# Patient Record
Sex: FEMALE | Race: WHITE | NOT HISPANIC OR LATINO | Employment: STUDENT | URBAN - NONMETROPOLITAN AREA
[De-identification: names, ages, dates, MRNs, and addresses within clinical notes are randomized per-mention and may not be internally consistent; named-entity substitution may affect disease eponyms.]

---

## 2020-06-19 ENCOUNTER — TELEMEDICINE (OUTPATIENT)
Dept: FAMILY MEDICINE CLINIC | Facility: CLINIC | Age: 20
End: 2020-06-19
Payer: COMMERCIAL

## 2020-06-19 DIAGNOSIS — Z00.00 ENCOUNTER FOR MEDICAL EXAMINATION TO ESTABLISH CARE: Primary | ICD-10-CM

## 2020-06-19 DIAGNOSIS — F90.2 ATTENTION DEFICIT HYPERACTIVITY DISORDER (ADHD), COMBINED TYPE: ICD-10-CM

## 2020-06-19 DIAGNOSIS — F41.8 ANXIETY WITH DEPRESSION: ICD-10-CM

## 2020-06-19 PROBLEM — F90.9 ADHD: Status: ACTIVE | Noted: 2020-06-19

## 2020-06-19 PROCEDURE — 99202 OFFICE O/P NEW SF 15 MIN: CPT | Performed by: NURSE PRACTITIONER

## 2020-06-19 RX ORDER — DEXTROAMPHETAMINE SACCHARATE, AMPHETAMINE ASPARTATE, DEXTROAMPHETAMINE SULFATE AND AMPHETAMINE SULFATE 5; 5; 5; 5 MG/1; MG/1; MG/1; MG/1
TABLET ORAL
COMMUNITY
Start: 2016-09-30 | End: 2020-10-02

## 2020-10-02 ENCOUNTER — APPOINTMENT (EMERGENCY)
Dept: RADIOLOGY | Facility: HOSPITAL | Age: 20
End: 2020-10-02
Payer: COMMERCIAL

## 2020-10-02 ENCOUNTER — TELEPHONE (OUTPATIENT)
Dept: OBGYN CLINIC | Facility: HOSPITAL | Age: 20
End: 2020-10-02

## 2020-10-02 ENCOUNTER — HOSPITAL ENCOUNTER (OUTPATIENT)
Facility: HOSPITAL | Age: 20
Setting detail: OBSERVATION
Discharge: HOME | End: 2020-10-05
Attending: EMERGENCY MEDICINE | Admitting: FAMILY MEDICINE
Payer: COMMERCIAL

## 2020-10-02 DIAGNOSIS — S82.892A CLOSED FRACTURE OF LEFT ANKLE, INITIAL ENCOUNTER: ICD-10-CM

## 2020-10-02 DIAGNOSIS — R11.0 NAUSEA: ICD-10-CM

## 2020-10-02 DIAGNOSIS — S82.409A FIBULA FRACTURE: Primary | ICD-10-CM

## 2020-10-02 DIAGNOSIS — M79.605 PAIN OF LEFT LOWER EXTREMITY: ICD-10-CM

## 2020-10-02 PROBLEM — S82.402A LEFT FIBULAR FRACTURE: Status: ACTIVE | Noted: 2020-10-02

## 2020-10-02 PROCEDURE — 73610 X-RAY EXAM OF ANKLE: CPT

## 2020-10-02 PROCEDURE — 73630 X-RAY EXAM OF FOOT: CPT

## 2020-10-02 PROCEDURE — 99284 EMERGENCY DEPT VISIT MOD MDM: CPT

## 2020-10-02 PROCEDURE — 96375 TX/PRO/DX INJ NEW DRUG ADDON: CPT

## 2020-10-02 PROCEDURE — 96374 THER/PROPH/DIAG INJ IV PUSH: CPT

## 2020-10-02 PROCEDURE — NC001 PR NO CHARGE: Performed by: FAMILY MEDICINE

## 2020-10-02 PROCEDURE — 99285 EMERGENCY DEPT VISIT HI MDM: CPT | Performed by: EMERGENCY MEDICINE

## 2020-10-02 PROCEDURE — 73590 X-RAY EXAM OF LOWER LEG: CPT

## 2020-10-02 RX ORDER — KETOROLAC TROMETHAMINE 30 MG/ML
30 INJECTION, SOLUTION INTRAMUSCULAR; INTRAVENOUS ONCE
Status: COMPLETED | OUTPATIENT
Start: 2020-10-02 | End: 2020-10-02

## 2020-10-02 RX ORDER — OXYCODONE HYDROCHLORIDE AND ACETAMINOPHEN 5; 325 MG/1; MG/1
1 TABLET ORAL EVERY 4 HOURS PRN
Status: ON HOLD | COMMUNITY
End: 2020-10-05 | Stop reason: SDUPTHER

## 2020-10-02 RX ORDER — METOCLOPRAMIDE HYDROCHLORIDE 5 MG/ML
10 INJECTION INTRAMUSCULAR; INTRAVENOUS ONCE
Status: COMPLETED | OUTPATIENT
Start: 2020-10-02 | End: 2020-10-02

## 2020-10-02 RX ORDER — ONDANSETRON 2 MG/ML
4 INJECTION INTRAMUSCULAR; INTRAVENOUS EVERY 6 HOURS PRN
Status: DISCONTINUED | OUTPATIENT
Start: 2020-10-02 | End: 2020-10-02

## 2020-10-02 RX ORDER — ONDANSETRON 2 MG/ML
4 INJECTION INTRAMUSCULAR; INTRAVENOUS ONCE
Status: COMPLETED | OUTPATIENT
Start: 2020-10-02 | End: 2020-10-02

## 2020-10-02 RX ORDER — ONDANSETRON 2 MG/ML
4 INJECTION INTRAMUSCULAR; INTRAVENOUS EVERY 6 HOURS SCHEDULED
Status: DISCONTINUED | OUTPATIENT
Start: 2020-10-03 | End: 2020-10-03

## 2020-10-02 RX ORDER — OXYCODONE HYDROCHLORIDE AND ACETAMINOPHEN 5; 325 MG/1; MG/1
1 TABLET ORAL EVERY 4 HOURS PRN
Status: DISCONTINUED | OUTPATIENT
Start: 2020-10-02 | End: 2020-10-04

## 2020-10-02 RX ADMIN — METOCLOPRAMIDE 10 MG: 5 INJECTION, SOLUTION INTRAMUSCULAR; INTRAVENOUS at 22:39

## 2020-10-02 RX ADMIN — MORPHINE SULFATE 2 MG: 2 INJECTION, SOLUTION INTRAMUSCULAR; INTRAVENOUS at 18:29

## 2020-10-02 RX ADMIN — KETOROLAC TROMETHAMINE 30 MG: 30 INJECTION, SOLUTION INTRAMUSCULAR at 22:30

## 2020-10-02 RX ADMIN — ONDANSETRON 4 MG: 2 INJECTION INTRAMUSCULAR; INTRAVENOUS at 18:28

## 2020-10-03 ENCOUNTER — ANESTHESIA EVENT (OUTPATIENT)
Dept: PERIOP | Facility: HOSPITAL | Age: 20
End: 2020-10-03
Payer: COMMERCIAL

## 2020-10-03 PROBLEM — S82.892A CLOSED LEFT ANKLE FRACTURE: Status: ACTIVE | Noted: 2020-10-02

## 2020-10-03 LAB
ABO GROUP BLD: NORMAL
ABO GROUP BLD: NORMAL
ANION GAP SERPL CALCULATED.3IONS-SCNC: 6 MMOL/L (ref 4–13)
APTT PPP: 32 SECONDS (ref 23–37)
BLD GP AB SCN SERPL QL: NEGATIVE
BUN SERPL-MCNC: 10 MG/DL (ref 5–25)
CALCIUM SERPL-MCNC: 9.5 MG/DL (ref 8.3–10.1)
CHLORIDE SERPL-SCNC: 108 MMOL/L (ref 100–108)
CO2 SERPL-SCNC: 25 MMOL/L (ref 21–32)
CREAT SERPL-MCNC: 0.64 MG/DL (ref 0.6–1.3)
ERYTHROCYTE [DISTWIDTH] IN BLOOD BY AUTOMATED COUNT: 13.2 % (ref 11.6–15.1)
GFR SERPL CREATININE-BSD FRML MDRD: 128 ML/MIN/1.73SQ M
GLUCOSE SERPL-MCNC: 98 MG/DL (ref 65–140)
HCG SERPL QL: NEGATIVE
HCT VFR BLD AUTO: 41.1 % (ref 34.8–46.1)
HGB BLD-MCNC: 13.6 G/DL (ref 11.5–15.4)
INR PPP: 1.08 (ref 0.84–1.19)
MCH RBC QN AUTO: 28.2 PG (ref 26.8–34.3)
MCHC RBC AUTO-ENTMCNC: 33.1 G/DL (ref 31.4–37.4)
MCV RBC AUTO: 85 FL (ref 82–98)
PLATELET # BLD AUTO: 330 THOUSANDS/UL (ref 149–390)
PLATELET # BLD AUTO: 339 THOUSANDS/UL (ref 149–390)
PMV BLD AUTO: 9.8 FL (ref 8.9–12.7)
PMV BLD AUTO: 9.9 FL (ref 8.9–12.7)
POTASSIUM SERPL-SCNC: 3.5 MMOL/L (ref 3.5–5.3)
PROTHROMBIN TIME: 14 SECONDS (ref 11.6–14.5)
RBC # BLD AUTO: 4.82 MILLION/UL (ref 3.81–5.12)
RH BLD: POSITIVE
RH BLD: POSITIVE
SARS-COV-2 RNA RESP QL NAA+PROBE: NEGATIVE
SODIUM SERPL-SCNC: 139 MMOL/L (ref 136–145)
SPECIMEN EXPIRATION DATE: NORMAL
WBC # BLD AUTO: 11.96 THOUSAND/UL (ref 4.31–10.16)

## 2020-10-03 PROCEDURE — 99245 OFF/OP CONSLTJ NEW/EST HI 55: CPT | Performed by: ORTHOPAEDIC SURGERY

## 2020-10-03 PROCEDURE — 86901 BLOOD TYPING SEROLOGIC RH(D): CPT | Performed by: ORTHOPAEDIC SURGERY

## 2020-10-03 PROCEDURE — U0003 INFECTIOUS AGENT DETECTION BY NUCLEIC ACID (DNA OR RNA); SEVERE ACUTE RESPIRATORY SYNDROME CORONAVIRUS 2 (SARS-COV-2) (CORONAVIRUS DISEASE [COVID-19]), AMPLIFIED PROBE TECHNIQUE, MAKING USE OF HIGH THROUGHPUT TECHNOLOGIES AS DESCRIBED BY CMS-2020-01-R: HCPCS | Performed by: ORTHOPAEDIC SURGERY

## 2020-10-03 PROCEDURE — 80048 BASIC METABOLIC PNL TOTAL CA: CPT | Performed by: ORTHOPAEDIC SURGERY

## 2020-10-03 PROCEDURE — 86850 RBC ANTIBODY SCREEN: CPT | Performed by: ORTHOPAEDIC SURGERY

## 2020-10-03 PROCEDURE — 84703 CHORIONIC GONADOTROPIN ASSAY: CPT | Performed by: ORTHOPAEDIC SURGERY

## 2020-10-03 PROCEDURE — 90686 IIV4 VACC NO PRSV 0.5 ML IM: CPT | Performed by: FAMILY MEDICINE

## 2020-10-03 PROCEDURE — 85027 COMPLETE CBC AUTOMATED: CPT | Performed by: ORTHOPAEDIC SURGERY

## 2020-10-03 PROCEDURE — 90471 IMMUNIZATION ADMIN: CPT | Performed by: FAMILY MEDICINE

## 2020-10-03 PROCEDURE — 85730 THROMBOPLASTIN TIME PARTIAL: CPT | Performed by: ORTHOPAEDIC SURGERY

## 2020-10-03 PROCEDURE — 99219 PR INITIAL OBSERVATION CARE/DAY 50 MINUTES: CPT | Performed by: FAMILY MEDICINE

## 2020-10-03 PROCEDURE — 85049 AUTOMATED PLATELET COUNT: CPT | Performed by: FAMILY MEDICINE

## 2020-10-03 PROCEDURE — 86900 BLOOD TYPING SEROLOGIC ABO: CPT | Performed by: ORTHOPAEDIC SURGERY

## 2020-10-03 PROCEDURE — NC001 PR NO CHARGE: Performed by: ORTHOPAEDIC SURGERY

## 2020-10-03 PROCEDURE — 85610 PROTHROMBIN TIME: CPT | Performed by: ORTHOPAEDIC SURGERY

## 2020-10-03 RX ORDER — ONDANSETRON 2 MG/ML
4 INJECTION INTRAMUSCULAR; INTRAVENOUS EVERY 6 HOURS PRN
Status: DISCONTINUED | OUTPATIENT
Start: 2020-10-03 | End: 2020-10-05 | Stop reason: HOSPADM

## 2020-10-03 RX ORDER — KETOROLAC TROMETHAMINE 30 MG/ML
30 INJECTION, SOLUTION INTRAMUSCULAR; INTRAVENOUS ONCE
Status: COMPLETED | OUTPATIENT
Start: 2020-10-03 | End: 2020-10-03

## 2020-10-03 RX ORDER — ACETAMINOPHEN 325 MG/1
650 TABLET ORAL EVERY 6 HOURS PRN
Status: DISCONTINUED | OUTPATIENT
Start: 2020-10-03 | End: 2020-10-04

## 2020-10-03 RX ORDER — SODIUM CHLORIDE, SODIUM LACTATE, POTASSIUM CHLORIDE, CALCIUM CHLORIDE 600; 310; 30; 20 MG/100ML; MG/100ML; MG/100ML; MG/100ML
75 INJECTION, SOLUTION INTRAVENOUS CONTINUOUS
Status: DISCONTINUED | OUTPATIENT
Start: 2020-10-04 | End: 2020-10-05 | Stop reason: HOSPADM

## 2020-10-03 RX ADMIN — OXYCODONE HYDROCHLORIDE AND ACETAMINOPHEN 1 TABLET: 5; 325 TABLET ORAL at 19:19

## 2020-10-03 RX ADMIN — ONDANSETRON 4 MG: 2 INJECTION INTRAMUSCULAR; INTRAVENOUS at 05:40

## 2020-10-03 RX ADMIN — INFLUENZA VIRUS VACCINE 0.5 ML: 15; 15; 15; 15 SUSPENSION INTRAMUSCULAR at 05:38

## 2020-10-03 RX ADMIN — ONDANSETRON 4 MG: 2 INJECTION INTRAMUSCULAR; INTRAVENOUS at 00:41

## 2020-10-03 RX ADMIN — KETOROLAC TROMETHAMINE 30 MG: 30 INJECTION, SOLUTION INTRAMUSCULAR at 13:42

## 2020-10-03 RX ADMIN — ENOXAPARIN SODIUM 40 MG: 40 INJECTION SUBCUTANEOUS at 08:27

## 2020-10-04 ENCOUNTER — ANESTHESIA (OUTPATIENT)
Dept: PERIOP | Facility: HOSPITAL | Age: 20
End: 2020-10-04
Payer: COMMERCIAL

## 2020-10-04 ENCOUNTER — APPOINTMENT (OUTPATIENT)
Dept: RADIOLOGY | Facility: HOSPITAL | Age: 20
End: 2020-10-04
Payer: COMMERCIAL

## 2020-10-04 VITALS — HEART RATE: 108 BPM

## 2020-10-04 LAB
BASOPHILS # BLD AUTO: 0.05 THOUSANDS/ΜL (ref 0–0.1)
BASOPHILS NFR BLD AUTO: 1 % (ref 0–1)
EOSINOPHIL # BLD AUTO: 0.17 THOUSAND/ΜL (ref 0–0.61)
EOSINOPHIL NFR BLD AUTO: 2 % (ref 0–6)
ERYTHROCYTE [DISTWIDTH] IN BLOOD BY AUTOMATED COUNT: 13.1 % (ref 11.6–15.1)
HCT VFR BLD AUTO: 40.8 % (ref 34.8–46.1)
HGB BLD-MCNC: 13 G/DL (ref 11.5–15.4)
IMM GRANULOCYTES # BLD AUTO: 0.06 THOUSAND/UL (ref 0–0.2)
IMM GRANULOCYTES NFR BLD AUTO: 1 % (ref 0–2)
LYMPHOCYTES # BLD AUTO: 3.31 THOUSANDS/ΜL (ref 0.6–4.47)
LYMPHOCYTES NFR BLD AUTO: 31 % (ref 14–44)
MCH RBC QN AUTO: 27.8 PG (ref 26.8–34.3)
MCHC RBC AUTO-ENTMCNC: 31.9 G/DL (ref 31.4–37.4)
MCV RBC AUTO: 87 FL (ref 82–98)
MONOCYTES # BLD AUTO: 0.91 THOUSAND/ΜL (ref 0.17–1.22)
MONOCYTES NFR BLD AUTO: 9 % (ref 4–12)
NEUTROPHILS # BLD AUTO: 6.22 THOUSANDS/ΜL (ref 1.85–7.62)
NEUTS SEG NFR BLD AUTO: 56 % (ref 43–75)
NRBC BLD AUTO-RTO: 0 /100 WBCS
PLATELET # BLD AUTO: 325 THOUSANDS/UL (ref 149–390)
PMV BLD AUTO: 10 FL (ref 8.9–12.7)
RBC # BLD AUTO: 4.68 MILLION/UL (ref 3.81–5.12)
WBC # BLD AUTO: 10.72 THOUSAND/UL (ref 4.31–10.16)

## 2020-10-04 PROCEDURE — 27784 TREATMENT OF FIBULA FRACTURE: CPT | Performed by: ORTHOPAEDIC SURGERY

## 2020-10-04 PROCEDURE — C1713 ANCHOR/SCREW BN/BN,TIS/BN: HCPCS | Performed by: ORTHOPAEDIC SURGERY

## 2020-10-04 PROCEDURE — 99225 PR SBSQ OBSERVATION CARE/DAY 25 MINUTES: CPT | Performed by: FAMILY MEDICINE

## 2020-10-04 PROCEDURE — 73610 X-RAY EXAM OF ANKLE: CPT

## 2020-10-04 PROCEDURE — NC001 PR NO CHARGE: Performed by: ORTHOPAEDIC SURGERY

## 2020-10-04 PROCEDURE — 85025 COMPLETE CBC W/AUTO DIFF WBC: CPT | Performed by: ORTHOPAEDIC SURGERY

## 2020-10-04 PROCEDURE — 27829 TREAT LOWER LEG JOINT: CPT | Performed by: ORTHOPAEDIC SURGERY

## 2020-10-04 DEVICE — 3.5MM LCP PLATE 10 HOLES 137MM
Type: IMPLANTABLE DEVICE | Site: ANKLE | Status: FUNCTIONAL
Brand: LCP

## 2020-10-04 DEVICE — 3.5MM CORTEX SCREW SELF-TAPPING 14MM: Type: IMPLANTABLE DEVICE | Site: ANKLE | Status: FUNCTIONAL

## 2020-10-04 DEVICE — 3.5MM CORTEX SCREW SELF-TAPPING 12MM: Type: IMPLANTABLE DEVICE | Site: ANKLE | Status: FUNCTIONAL

## 2020-10-04 DEVICE — 4.0MM CANCELLOUS BONE SCREW FULLY THREADED/16MM: Type: IMPLANTABLE DEVICE | Site: ANKLE | Status: FUNCTIONAL

## 2020-10-04 DEVICE — 3.5MM CORTEX SCREW SELF-TAPPING 50MM: Type: IMPLANTABLE DEVICE | Site: ANKLE | Status: FUNCTIONAL

## 2020-10-04 RX ORDER — CEFAZOLIN SODIUM 2 G/50ML
SOLUTION INTRAVENOUS AS NEEDED
Status: DISCONTINUED | OUTPATIENT
Start: 2020-10-04 | End: 2020-10-04

## 2020-10-04 RX ORDER — ONDANSETRON 2 MG/ML
INJECTION INTRAMUSCULAR; INTRAVENOUS AS NEEDED
Status: DISCONTINUED | OUTPATIENT
Start: 2020-10-04 | End: 2020-10-04

## 2020-10-04 RX ORDER — PROPOFOL 10 MG/ML
INJECTION, EMULSION INTRAVENOUS AS NEEDED
Status: DISCONTINUED | OUTPATIENT
Start: 2020-10-04 | End: 2020-10-04

## 2020-10-04 RX ORDER — FENTANYL CITRATE 50 UG/ML
INJECTION, SOLUTION INTRAMUSCULAR; INTRAVENOUS AS NEEDED
Status: DISCONTINUED | OUTPATIENT
Start: 2020-10-04 | End: 2020-10-04

## 2020-10-04 RX ORDER — MIDAZOLAM HYDROCHLORIDE 2 MG/2ML
INJECTION, SOLUTION INTRAMUSCULAR; INTRAVENOUS AS NEEDED
Status: DISCONTINUED | OUTPATIENT
Start: 2020-10-04 | End: 2020-10-04

## 2020-10-04 RX ORDER — OXYCODONE HYDROCHLORIDE 5 MG/1
5 TABLET ORAL EVERY 4 HOURS PRN
Status: DISCONTINUED | OUTPATIENT
Start: 2020-10-04 | End: 2020-10-05 | Stop reason: HOSPADM

## 2020-10-04 RX ORDER — ACETAMINOPHEN 325 MG/1
975 TABLET ORAL EVERY 8 HOURS SCHEDULED
Status: DISCONTINUED | OUTPATIENT
Start: 2020-10-04 | End: 2020-10-05 | Stop reason: HOSPADM

## 2020-10-04 RX ORDER — HYDROMORPHONE HCL/PF 1 MG/ML
0.4 SYRINGE (ML) INJECTION
Status: COMPLETED | OUTPATIENT
Start: 2020-10-04 | End: 2020-10-04

## 2020-10-04 RX ORDER — GABAPENTIN 100 MG/1
100 CAPSULE ORAL EVERY 8 HOURS
Status: DISCONTINUED | OUTPATIENT
Start: 2020-10-04 | End: 2020-10-05 | Stop reason: HOSPADM

## 2020-10-04 RX ORDER — ROPIVACAINE HYDROCHLORIDE 5 MG/ML
INJECTION, SOLUTION EPIDURAL; INFILTRATION; PERINEURAL
Status: COMPLETED | OUTPATIENT
Start: 2020-10-04 | End: 2020-10-04

## 2020-10-04 RX ORDER — OXYCODONE HYDROCHLORIDE 5 MG/1
10 TABLET ORAL EVERY 4 HOURS PRN
Status: DISCONTINUED | OUTPATIENT
Start: 2020-10-04 | End: 2020-10-05 | Stop reason: HOSPADM

## 2020-10-04 RX ORDER — MAGNESIUM HYDROXIDE 1200 MG/15ML
LIQUID ORAL AS NEEDED
Status: DISCONTINUED | OUTPATIENT
Start: 2020-10-04 | End: 2020-10-04 | Stop reason: HOSPADM

## 2020-10-04 RX ORDER — SODIUM CHLORIDE, SODIUM LACTATE, POTASSIUM CHLORIDE, CALCIUM CHLORIDE 600; 310; 30; 20 MG/100ML; MG/100ML; MG/100ML; MG/100ML
125 INJECTION, SOLUTION INTRAVENOUS CONTINUOUS
Status: DISCONTINUED | OUTPATIENT
Start: 2020-10-04 | End: 2020-10-05 | Stop reason: HOSPADM

## 2020-10-04 RX ORDER — FENTANYL CITRATE/PF 50 MCG/ML
25 SYRINGE (ML) INJECTION
Status: DISCONTINUED | OUTPATIENT
Start: 2020-10-04 | End: 2020-10-04

## 2020-10-04 RX ORDER — LIDOCAINE HYDROCHLORIDE 10 MG/ML
INJECTION, SOLUTION EPIDURAL; INFILTRATION; INTRACAUDAL; PERINEURAL AS NEEDED
Status: DISCONTINUED | OUTPATIENT
Start: 2020-10-04 | End: 2020-10-04

## 2020-10-04 RX ORDER — ONDANSETRON 2 MG/ML
4 INJECTION INTRAMUSCULAR; INTRAVENOUS ONCE AS NEEDED
Status: DISCONTINUED | OUTPATIENT
Start: 2020-10-04 | End: 2020-10-04

## 2020-10-04 RX ORDER — HYDROMORPHONE HCL/PF 1 MG/ML
SYRINGE (ML) INJECTION AS NEEDED
Status: DISCONTINUED | OUTPATIENT
Start: 2020-10-04 | End: 2020-10-04

## 2020-10-04 RX ORDER — METHOCARBAMOL 750 MG/1
750 TABLET, FILM COATED ORAL EVERY 6 HOURS SCHEDULED
Status: DISCONTINUED | OUTPATIENT
Start: 2020-10-04 | End: 2020-10-05 | Stop reason: HOSPADM

## 2020-10-04 RX ORDER — DEXAMETHASONE SODIUM PHOSPHATE 10 MG/ML
INJECTION, SOLUTION INTRAMUSCULAR; INTRAVENOUS AS NEEDED
Status: DISCONTINUED | OUTPATIENT
Start: 2020-10-04 | End: 2020-10-04

## 2020-10-04 RX ADMIN — HYDROMORPHONE HYDROCHLORIDE 0.5 MG: 1 INJECTION, SOLUTION INTRAMUSCULAR; INTRAVENOUS; SUBCUTANEOUS at 08:40

## 2020-10-04 RX ADMIN — ONDANSETRON 4 MG: 2 INJECTION INTRAMUSCULAR; INTRAVENOUS at 08:12

## 2020-10-04 RX ADMIN — MIDAZOLAM 2 MG: 1 INJECTION INTRAMUSCULAR; INTRAVENOUS at 08:07

## 2020-10-04 RX ADMIN — CEFAZOLIN SODIUM 2000 MG: 10 INJECTION, POWDER, FOR SOLUTION INTRAVENOUS at 15:07

## 2020-10-04 RX ADMIN — HYDROMORPHONE HYDROCHLORIDE 0.4 MG: 1 INJECTION, SOLUTION INTRAMUSCULAR; INTRAVENOUS; SUBCUTANEOUS at 09:48

## 2020-10-04 RX ADMIN — PROPOFOL 200 MG: 10 INJECTION, EMULSION INTRAVENOUS at 08:10

## 2020-10-04 RX ADMIN — CEFAZOLIN SODIUM 2000 MG: 2 SOLUTION INTRAVENOUS at 08:05

## 2020-10-04 RX ADMIN — FENTANYL CITRATE 50 MCG: 50 INJECTION, SOLUTION INTRAMUSCULAR; INTRAVENOUS at 08:58

## 2020-10-04 RX ADMIN — HYDROMORPHONE HYDROCHLORIDE 0.4 MG: 1 INJECTION, SOLUTION INTRAMUSCULAR; INTRAVENOUS; SUBCUTANEOUS at 09:26

## 2020-10-04 RX ADMIN — SODIUM CHLORIDE, SODIUM LACTATE, POTASSIUM CHLORIDE, AND CALCIUM CHLORIDE 75 ML/HR: .6; .31; .03; .02 INJECTION, SOLUTION INTRAVENOUS at 00:20

## 2020-10-04 RX ADMIN — FENTANYL CITRATE 50 MCG: 50 INJECTION, SOLUTION INTRAMUSCULAR; INTRAVENOUS at 08:33

## 2020-10-04 RX ADMIN — ONDANSETRON 4 MG: 2 INJECTION INTRAMUSCULAR; INTRAVENOUS at 07:24

## 2020-10-04 RX ADMIN — CEFAZOLIN SODIUM 2000 MG: 10 INJECTION, POWDER, FOR SOLUTION INTRAVENOUS at 23:47

## 2020-10-04 RX ADMIN — HYDROMORPHONE HYDROCHLORIDE 0.4 MG: 1 INJECTION, SOLUTION INTRAMUSCULAR; INTRAVENOUS; SUBCUTANEOUS at 09:54

## 2020-10-04 RX ADMIN — ENOXAPARIN SODIUM 40 MG: 40 INJECTION SUBCUTANEOUS at 23:47

## 2020-10-04 RX ADMIN — FENTANYL CITRATE 50 MCG: 50 INJECTION, SOLUTION INTRAMUSCULAR; INTRAVENOUS at 08:13

## 2020-10-04 RX ADMIN — DOCUSATE SODIUM 50 MG: 50 CAPSULE, LIQUID FILLED ORAL at 18:04

## 2020-10-04 RX ADMIN — ACETAMINOPHEN 975 MG: 325 TABLET, FILM COATED ORAL at 15:06

## 2020-10-04 RX ADMIN — METHOCARBAMOL TABLETS 750 MG: 750 TABLET, COATED ORAL at 23:47

## 2020-10-04 RX ADMIN — LIDOCAINE HYDROCHLORIDE 50 MG: 10 INJECTION, SOLUTION EPIDURAL; INFILTRATION; INTRACAUDAL; PERINEURAL at 08:10

## 2020-10-04 RX ADMIN — METHOCARBAMOL TABLETS 750 MG: 750 TABLET, COATED ORAL at 11:21

## 2020-10-04 RX ADMIN — ROPIVACAINE HYDROCHLORIDE 30 ML: 5 INJECTION, SOLUTION EPIDURAL; INFILTRATION; PERINEURAL at 10:20

## 2020-10-04 RX ADMIN — METHOCARBAMOL TABLETS 750 MG: 750 TABLET, COATED ORAL at 18:04

## 2020-10-04 RX ADMIN — HYDROMORPHONE HYDROCHLORIDE 0.4 MG: 1 INJECTION, SOLUTION INTRAMUSCULAR; INTRAVENOUS; SUBCUTANEOUS at 09:34

## 2020-10-04 RX ADMIN — ACETAMINOPHEN 975 MG: 325 TABLET, FILM COATED ORAL at 22:04

## 2020-10-04 RX ADMIN — GABAPENTIN 100 MG: 100 CAPSULE ORAL at 11:21

## 2020-10-04 RX ADMIN — FENTANYL CITRATE 50 MCG: 50 INJECTION, SOLUTION INTRAMUSCULAR; INTRAVENOUS at 09:11

## 2020-10-04 RX ADMIN — HYDROMORPHONE HYDROCHLORIDE 0.4 MG: 1 INJECTION, SOLUTION INTRAMUSCULAR; INTRAVENOUS; SUBCUTANEOUS at 09:42

## 2020-10-04 RX ADMIN — GABAPENTIN 100 MG: 100 CAPSULE ORAL at 18:04

## 2020-10-04 RX ADMIN — FENTANYL CITRATE 50 MCG: 50 INJECTION, SOLUTION INTRAMUSCULAR; INTRAVENOUS at 08:27

## 2020-10-04 RX ADMIN — FENTANYL CITRATE 50 MCG: 50 INJECTION, SOLUTION INTRAMUSCULAR; INTRAVENOUS at 08:45

## 2020-10-04 RX ADMIN — DEXAMETHASONE SODIUM PHOSPHATE 5 MG: 10 INJECTION, SOLUTION INTRAMUSCULAR; INTRAVENOUS at 08:12

## 2020-10-05 ENCOUNTER — TRANSITIONAL CARE MANAGEMENT (OUTPATIENT)
Dept: FAMILY MEDICINE CLINIC | Facility: CLINIC | Age: 20
End: 2020-10-05

## 2020-10-05 VITALS
HEIGHT: 67 IN | BODY MASS INDEX: 36.88 KG/M2 | OXYGEN SATURATION: 96 % | WEIGHT: 235 LBS | SYSTOLIC BLOOD PRESSURE: 120 MMHG | RESPIRATION RATE: 18 BRPM | TEMPERATURE: 99.6 F | HEART RATE: 66 BPM | DIASTOLIC BLOOD PRESSURE: 78 MMHG

## 2020-10-05 LAB
ANION GAP SERPL CALCULATED.3IONS-SCNC: 7 MMOL/L (ref 4–13)
BUN SERPL-MCNC: 9 MG/DL (ref 5–25)
CALCIUM SERPL-MCNC: 9.2 MG/DL (ref 8.3–10.1)
CHLORIDE SERPL-SCNC: 109 MMOL/L (ref 100–108)
CO2 SERPL-SCNC: 25 MMOL/L (ref 21–32)
CREAT SERPL-MCNC: 0.58 MG/DL (ref 0.6–1.3)
ERYTHROCYTE [DISTWIDTH] IN BLOOD BY AUTOMATED COUNT: 12.8 % (ref 11.6–15.1)
GFR SERPL CREATININE-BSD FRML MDRD: 132 ML/MIN/1.73SQ M
GLUCOSE SERPL-MCNC: 89 MG/DL (ref 65–140)
HCT VFR BLD AUTO: 38 % (ref 34.8–46.1)
HGB BLD-MCNC: 12.3 G/DL (ref 11.5–15.4)
MCH RBC QN AUTO: 27.6 PG (ref 26.8–34.3)
MCHC RBC AUTO-ENTMCNC: 32.4 G/DL (ref 31.4–37.4)
MCV RBC AUTO: 85 FL (ref 82–98)
PLATELET # BLD AUTO: 344 THOUSANDS/UL (ref 149–390)
PMV BLD AUTO: 9.8 FL (ref 8.9–12.7)
POTASSIUM SERPL-SCNC: 3.6 MMOL/L (ref 3.5–5.3)
RBC # BLD AUTO: 4.46 MILLION/UL (ref 3.81–5.12)
SODIUM SERPL-SCNC: 141 MMOL/L (ref 136–145)
WBC # BLD AUTO: 12.42 THOUSAND/UL (ref 4.31–10.16)

## 2020-10-05 PROCEDURE — 85027 COMPLETE CBC AUTOMATED: CPT | Performed by: ORTHOPAEDIC SURGERY

## 2020-10-05 PROCEDURE — 97163 PT EVAL HIGH COMPLEX 45 MIN: CPT

## 2020-10-05 PROCEDURE — 80048 BASIC METABOLIC PNL TOTAL CA: CPT | Performed by: ORTHOPAEDIC SURGERY

## 2020-10-05 PROCEDURE — 99217 PR OBSERVATION CARE DISCHARGE MANAGEMENT: CPT | Performed by: FAMILY MEDICINE

## 2020-10-05 PROCEDURE — 99024 POSTOP FOLLOW-UP VISIT: CPT | Performed by: ORTHOPAEDIC SURGERY

## 2020-10-05 PROCEDURE — 97166 OT EVAL MOD COMPLEX 45 MIN: CPT

## 2020-10-05 RX ORDER — GABAPENTIN 100 MG/1
100 CAPSULE ORAL EVERY 8 HOURS
Qty: 90 CAPSULE | Refills: 0 | Status: SHIPPED | OUTPATIENT
Start: 2020-10-05 | End: 2020-11-18

## 2020-10-05 RX ORDER — OXYCODONE HYDROCHLORIDE AND ACETAMINOPHEN 5; 325 MG/1; MG/1
1 TABLET ORAL EVERY 4 HOURS PRN
Qty: 42 TABLET | Refills: 0 | Status: SHIPPED | OUTPATIENT
Start: 2020-10-05 | End: 2020-10-05 | Stop reason: SDUPTHER

## 2020-10-05 RX ORDER — OXYCODONE HYDROCHLORIDE AND ACETAMINOPHEN 5; 325 MG/1; MG/1
1 TABLET ORAL EVERY 4 HOURS PRN
Qty: 42 TABLET | Refills: 0 | Status: SHIPPED | OUTPATIENT
Start: 2020-10-05 | End: 2020-10-15

## 2020-10-05 RX ORDER — ASPIRIN 81 MG/1
81 TABLET ORAL
Qty: 60 TABLET | Refills: 0 | Status: SHIPPED | OUTPATIENT
Start: 2020-10-05 | End: 2020-11-18

## 2020-10-05 RX ADMIN — DOCUSATE SODIUM 50 MG: 50 CAPSULE, LIQUID FILLED ORAL at 08:43

## 2020-10-05 RX ADMIN — METHOCARBAMOL TABLETS 750 MG: 750 TABLET, COATED ORAL at 11:06

## 2020-10-05 RX ADMIN — OXYCODONE HYDROCHLORIDE 5 MG: 5 TABLET ORAL at 05:41

## 2020-10-05 RX ADMIN — GABAPENTIN 100 MG: 100 CAPSULE ORAL at 03:34

## 2020-10-05 RX ADMIN — OXYCODONE HYDROCHLORIDE 10 MG: 5 TABLET ORAL at 12:16

## 2020-10-05 RX ADMIN — GABAPENTIN 100 MG: 100 CAPSULE ORAL at 11:06

## 2020-10-05 RX ADMIN — ACETAMINOPHEN 975 MG: 325 TABLET, FILM COATED ORAL at 05:35

## 2020-10-05 RX ADMIN — METHOCARBAMOL TABLETS 750 MG: 750 TABLET, COATED ORAL at 05:35

## 2020-10-06 ENCOUNTER — TELEPHONE (OUTPATIENT)
Dept: OBGYN CLINIC | Facility: HOSPITAL | Age: 20
End: 2020-10-06

## 2020-10-13 ENCOUNTER — TELEMEDICINE (OUTPATIENT)
Dept: FAMILY MEDICINE CLINIC | Facility: CLINIC | Age: 20
End: 2020-10-13
Payer: COMMERCIAL

## 2020-10-13 DIAGNOSIS — F33.2 SEVERE EPISODE OF RECURRENT MAJOR DEPRESSIVE DISORDER, WITHOUT PSYCHOTIC FEATURES (HCC): ICD-10-CM

## 2020-10-13 DIAGNOSIS — F90.2 ATTENTION DEFICIT HYPERACTIVITY DISORDER (ADHD), COMBINED TYPE: Primary | ICD-10-CM

## 2020-10-13 PROCEDURE — 99214 OFFICE O/P EST MOD 30 MIN: CPT | Performed by: FAMILY MEDICINE

## 2020-10-14 ENCOUNTER — DOCUMENTATION (OUTPATIENT)
Dept: BEHAVIORAL/MENTAL HEALTH CLINIC | Facility: CLINIC | Age: 20
End: 2020-10-14

## 2020-10-14 DIAGNOSIS — Z48.89 AFTERCARE FOLLOWING SURGERY: Primary | ICD-10-CM

## 2020-10-20 ENCOUNTER — HOSPITAL ENCOUNTER (OUTPATIENT)
Dept: RADIOLOGY | Facility: HOSPITAL | Age: 20
Discharge: HOME | End: 2020-10-20
Attending: ORTHOPAEDIC SURGERY
Payer: COMMERCIAL

## 2020-10-20 ENCOUNTER — OFFICE VISIT (OUTPATIENT)
Dept: OBGYN CLINIC | Facility: HOSPITAL | Age: 20
End: 2020-10-20

## 2020-10-20 VITALS
DIASTOLIC BLOOD PRESSURE: 79 MMHG | SYSTOLIC BLOOD PRESSURE: 128 MMHG | HEART RATE: 87 BPM | HEIGHT: 67 IN | BODY MASS INDEX: 36.81 KG/M2

## 2020-10-20 DIAGNOSIS — S82.409A FIBULA FRACTURE: ICD-10-CM

## 2020-10-20 DIAGNOSIS — Z48.89 AFTERCARE FOLLOWING SURGERY: ICD-10-CM

## 2020-10-20 DIAGNOSIS — Z09 FRACTURE FOLLOW-UP: Primary | ICD-10-CM

## 2020-10-20 PROCEDURE — 73610 X-RAY EXAM OF ANKLE: CPT

## 2020-10-20 PROCEDURE — 99024 POSTOP FOLLOW-UP VISIT: CPT | Performed by: ORTHOPAEDIC SURGERY

## 2020-10-21 ENCOUNTER — EVALUATION (OUTPATIENT)
Dept: PHYSICAL THERAPY | Facility: CLINIC | Age: 20
End: 2020-10-21
Payer: COMMERCIAL

## 2020-10-21 ENCOUNTER — TELEPHONE (OUTPATIENT)
Dept: OBGYN CLINIC | Facility: HOSPITAL | Age: 20
End: 2020-10-21

## 2020-10-21 DIAGNOSIS — Z09 FRACTURE FOLLOW-UP: Primary | ICD-10-CM

## 2020-10-21 PROCEDURE — 97140 MANUAL THERAPY 1/> REGIONS: CPT | Performed by: PHYSICAL THERAPIST

## 2020-10-21 PROCEDURE — 97161 PT EVAL LOW COMPLEX 20 MIN: CPT | Performed by: PHYSICAL THERAPIST

## 2020-10-22 ENCOUNTER — OFFICE VISIT (OUTPATIENT)
Dept: PHYSICAL THERAPY | Facility: CLINIC | Age: 20
End: 2020-10-22
Payer: COMMERCIAL

## 2020-10-22 DIAGNOSIS — Z09 FRACTURE FOLLOW-UP: Primary | ICD-10-CM

## 2020-10-22 PROCEDURE — 97110 THERAPEUTIC EXERCISES: CPT | Performed by: PHYSICAL THERAPIST

## 2020-10-22 PROCEDURE — 97140 MANUAL THERAPY 1/> REGIONS: CPT | Performed by: PHYSICAL THERAPIST

## 2020-10-23 ENCOUNTER — OFFICE VISIT (OUTPATIENT)
Dept: PHYSICAL THERAPY | Facility: CLINIC | Age: 20
End: 2020-10-23
Payer: COMMERCIAL

## 2020-10-23 DIAGNOSIS — Z09 FRACTURE FOLLOW-UP: Primary | ICD-10-CM

## 2020-10-23 PROCEDURE — 97110 THERAPEUTIC EXERCISES: CPT | Performed by: PHYSICAL THERAPIST

## 2020-10-23 PROCEDURE — 97140 MANUAL THERAPY 1/> REGIONS: CPT | Performed by: PHYSICAL THERAPIST

## 2020-10-26 ENCOUNTER — OFFICE VISIT (OUTPATIENT)
Dept: PHYSICAL THERAPY | Facility: CLINIC | Age: 20
End: 2020-10-26
Payer: COMMERCIAL

## 2020-10-26 ENCOUNTER — DOCUMENTATION (OUTPATIENT)
Dept: BEHAVIORAL/MENTAL HEALTH CLINIC | Facility: CLINIC | Age: 20
End: 2020-10-26

## 2020-10-26 DIAGNOSIS — Z09 FRACTURE FOLLOW-UP: Primary | ICD-10-CM

## 2020-10-26 PROCEDURE — 97010 HOT OR COLD PACKS THERAPY: CPT

## 2020-10-26 PROCEDURE — 97110 THERAPEUTIC EXERCISES: CPT

## 2020-10-26 PROCEDURE — 97140 MANUAL THERAPY 1/> REGIONS: CPT

## 2020-10-28 ENCOUNTER — APPOINTMENT (OUTPATIENT)
Dept: PHYSICAL THERAPY | Facility: CLINIC | Age: 20
End: 2020-10-28
Payer: COMMERCIAL

## 2020-10-29 ENCOUNTER — APPOINTMENT (OUTPATIENT)
Dept: PHYSICAL THERAPY | Facility: CLINIC | Age: 20
End: 2020-10-29
Payer: COMMERCIAL

## 2020-10-30 ENCOUNTER — TELEMEDICINE (OUTPATIENT)
Dept: BEHAVIORAL/MENTAL HEALTH CLINIC | Facility: CLINIC | Age: 20
End: 2020-10-30
Payer: COMMERCIAL

## 2020-10-30 ENCOUNTER — DOCUMENTATION (OUTPATIENT)
Dept: BEHAVIORAL/MENTAL HEALTH CLINIC | Facility: CLINIC | Age: 20
End: 2020-10-30

## 2020-10-30 DIAGNOSIS — F31.32 MODERATE BIPOLAR I DISORDER, MOST RECENT EPISODE DEPRESSED (HCC): Primary | ICD-10-CM

## 2020-10-30 PROCEDURE — 90837 PSYTX W PT 60 MINUTES: CPT | Performed by: SOCIAL WORKER

## 2020-10-30 PROCEDURE — 90791 PSYCH DIAGNOSTIC EVALUATION: CPT | Performed by: SOCIAL WORKER

## 2020-11-05 ENCOUNTER — TELEMEDICINE (OUTPATIENT)
Dept: BEHAVIORAL/MENTAL HEALTH CLINIC | Facility: CLINIC | Age: 20
End: 2020-11-05
Payer: COMMERCIAL

## 2020-11-05 DIAGNOSIS — F31.32 MODERATE BIPOLAR I DISORDER, MOST RECENT EPISODE DEPRESSED (HCC): Primary | ICD-10-CM

## 2020-11-05 PROCEDURE — 90837 PSYTX W PT 60 MINUTES: CPT | Performed by: SOCIAL WORKER

## 2020-11-09 DIAGNOSIS — F33.2 SEVERE EPISODE OF RECURRENT MAJOR DEPRESSIVE DISORDER, WITHOUT PSYCHOTIC FEATURES (HCC): Primary | ICD-10-CM

## 2020-11-10 ENCOUNTER — DOCUMENTATION (OUTPATIENT)
Dept: BEHAVIORAL/MENTAL HEALTH CLINIC | Facility: CLINIC | Age: 20
End: 2020-11-10

## 2020-11-10 ENCOUNTER — TELEPHONE (OUTPATIENT)
Dept: BEHAVIORAL/MENTAL HEALTH CLINIC | Facility: CLINIC | Age: 20
End: 2020-11-10

## 2020-11-11 ENCOUNTER — TELEPHONE (OUTPATIENT)
Dept: BEHAVIORAL/MENTAL HEALTH CLINIC | Facility: CLINIC | Age: 20
End: 2020-11-11

## 2020-11-16 ENCOUNTER — DOCUMENTATION (OUTPATIENT)
Dept: BEHAVIORAL/MENTAL HEALTH CLINIC | Facility: CLINIC | Age: 20
End: 2020-11-16

## 2020-11-17 ENCOUNTER — OFFICE VISIT (OUTPATIENT)
Dept: OBGYN CLINIC | Facility: HOSPITAL | Age: 20
End: 2020-11-17

## 2020-11-17 ENCOUNTER — TELEPHONE (OUTPATIENT)
Dept: PSYCHIATRY | Facility: CLINIC | Age: 20
End: 2020-11-17

## 2020-11-17 ENCOUNTER — HOSPITAL ENCOUNTER (OUTPATIENT)
Dept: RADIOLOGY | Facility: HOSPITAL | Age: 20
Discharge: HOME | End: 2020-11-17
Attending: ORTHOPAEDIC SURGERY
Payer: COMMERCIAL

## 2020-11-17 VITALS
DIASTOLIC BLOOD PRESSURE: 82 MMHG | BODY MASS INDEX: 36.81 KG/M2 | HEART RATE: 74 BPM | SYSTOLIC BLOOD PRESSURE: 125 MMHG | HEIGHT: 67 IN

## 2020-11-17 DIAGNOSIS — Z48.89 AFTERCARE FOLLOWING SURGERY: Primary | ICD-10-CM

## 2020-11-17 DIAGNOSIS — Z48.89 AFTERCARE FOLLOWING SURGERY: ICD-10-CM

## 2020-11-17 PROCEDURE — 73610 X-RAY EXAM OF ANKLE: CPT

## 2020-11-17 PROCEDURE — 99024 POSTOP FOLLOW-UP VISIT: CPT | Performed by: ORTHOPAEDIC SURGERY

## 2020-11-17 RX ORDER — SODIUM CHLORIDE, SODIUM LACTATE, POTASSIUM CHLORIDE, CALCIUM CHLORIDE 600; 310; 30; 20 MG/100ML; MG/100ML; MG/100ML; MG/100ML
125 INJECTION, SOLUTION INTRAVENOUS CONTINUOUS
Status: CANCELLED | OUTPATIENT
Start: 2020-11-17

## 2020-11-17 RX ORDER — LITHIUM CARBONATE 300 MG/1
CAPSULE ORAL
COMMUNITY
Start: 2020-11-13 | End: 2021-01-18 | Stop reason: SDUPTHER

## 2020-11-17 RX ORDER — CEFAZOLIN SODIUM 2 G/50ML
2000 SOLUTION INTRAVENOUS EVERY 8 HOURS
Status: CANCELLED | OUTPATIENT
Start: 2020-11-17 | End: 2020-11-20

## 2020-11-17 RX ORDER — CHLORHEXIDINE GLUCONATE 0.12 MG/ML
15 RINSE ORAL ONCE
Status: CANCELLED | OUTPATIENT
Start: 2020-11-17 | End: 2020-11-17

## 2020-11-18 ENCOUNTER — OFFICE VISIT (OUTPATIENT)
Dept: PHYSICAL THERAPY | Facility: CLINIC | Age: 20
End: 2020-11-18
Payer: COMMERCIAL

## 2020-11-18 DIAGNOSIS — Z09 FRACTURE FOLLOW-UP: Primary | ICD-10-CM

## 2020-11-18 PROCEDURE — 97110 THERAPEUTIC EXERCISES: CPT | Performed by: PHYSICAL THERAPIST

## 2020-11-23 ENCOUNTER — HOSPITAL ENCOUNTER (OUTPATIENT)
Facility: HOSPITAL | Age: 20
Setting detail: OUTPATIENT SURGERY
Discharge: HOME | End: 2020-11-23
Attending: ORTHOPAEDIC SURGERY | Admitting: ORTHOPAEDIC SURGERY
Payer: COMMERCIAL

## 2020-11-23 ENCOUNTER — HOSPITAL ENCOUNTER (OUTPATIENT)
Dept: RADIOLOGY | Facility: HOSPITAL | Age: 20
Setting detail: OUTPATIENT SURGERY
Discharge: HOME | End: 2020-11-23
Payer: COMMERCIAL

## 2020-11-23 ENCOUNTER — ANESTHESIA EVENT (OUTPATIENT)
Dept: PERIOP | Facility: HOSPITAL | Age: 20
End: 2020-11-23
Payer: COMMERCIAL

## 2020-11-23 ENCOUNTER — ANESTHESIA (OUTPATIENT)
Dept: PERIOP | Facility: HOSPITAL | Age: 20
End: 2020-11-23
Payer: COMMERCIAL

## 2020-11-23 VITALS
DIASTOLIC BLOOD PRESSURE: 86 MMHG | SYSTOLIC BLOOD PRESSURE: 131 MMHG | HEART RATE: 71 BPM | RESPIRATION RATE: 18 BRPM | HEIGHT: 67 IN | TEMPERATURE: 98.2 F | OXYGEN SATURATION: 95 % | WEIGHT: 232 LBS | BODY MASS INDEX: 36.41 KG/M2

## 2020-11-23 VITALS — HEART RATE: 65 BPM

## 2020-11-23 DIAGNOSIS — Z48.89 AFTERCARE FOLLOWING SURGERY: Primary | ICD-10-CM

## 2020-11-23 DIAGNOSIS — T84.84XA PAINFUL ORTHOPAEDIC HARDWARE (HCC): ICD-10-CM

## 2020-11-23 LAB
EXT PREGNANCY TEST URINE: NEGATIVE
EXT. CONTROL: NORMAL

## 2020-11-23 PROCEDURE — 81025 URINE PREGNANCY TEST: CPT | Performed by: ORTHOPAEDIC SURGERY

## 2020-11-23 PROCEDURE — 20680 REMOVAL OF IMPLANT DEEP: CPT | Performed by: ORTHOPAEDIC SURGERY

## 2020-11-23 PROCEDURE — 73600 X-RAY EXAM OF ANKLE: CPT

## 2020-11-23 RX ORDER — CEFAZOLIN SODIUM 1 G/3ML
INJECTION, POWDER, FOR SOLUTION INTRAMUSCULAR; INTRAVENOUS AS NEEDED
Status: DISCONTINUED | OUTPATIENT
Start: 2020-11-23 | End: 2020-11-23

## 2020-11-23 RX ORDER — ONDANSETRON 2 MG/ML
4 INJECTION INTRAMUSCULAR; INTRAVENOUS ONCE AS NEEDED
Status: DISCONTINUED | OUTPATIENT
Start: 2020-11-23 | End: 2020-11-23 | Stop reason: HOSPADM

## 2020-11-23 RX ORDER — DEXAMETHASONE SODIUM PHOSPHATE 10 MG/ML
INJECTION, SOLUTION INTRAMUSCULAR; INTRAVENOUS AS NEEDED
Status: DISCONTINUED | OUTPATIENT
Start: 2020-11-23 | End: 2020-11-23

## 2020-11-23 RX ORDER — MAGNESIUM HYDROXIDE 1200 MG/15ML
LIQUID ORAL AS NEEDED
Status: DISCONTINUED | OUTPATIENT
Start: 2020-11-23 | End: 2020-11-23 | Stop reason: HOSPADM

## 2020-11-23 RX ORDER — SODIUM CHLORIDE, SODIUM LACTATE, POTASSIUM CHLORIDE, CALCIUM CHLORIDE 600; 310; 30; 20 MG/100ML; MG/100ML; MG/100ML; MG/100ML
100 INJECTION, SOLUTION INTRAVENOUS CONTINUOUS
Status: DISCONTINUED | OUTPATIENT
Start: 2020-11-23 | End: 2020-11-23 | Stop reason: HOSPADM

## 2020-11-23 RX ORDER — SODIUM CHLORIDE, SODIUM LACTATE, POTASSIUM CHLORIDE, CALCIUM CHLORIDE 600; 310; 30; 20 MG/100ML; MG/100ML; MG/100ML; MG/100ML
INJECTION, SOLUTION INTRAVENOUS CONTINUOUS PRN
Status: DISCONTINUED | OUTPATIENT
Start: 2020-11-23 | End: 2020-11-23

## 2020-11-23 RX ORDER — CHLORHEXIDINE GLUCONATE 0.12 MG/ML
15 RINSE ORAL ONCE
Status: DISCONTINUED | OUTPATIENT
Start: 2020-11-23 | End: 2020-11-23

## 2020-11-23 RX ORDER — DIPHENHYDRAMINE HYDROCHLORIDE 50 MG/ML
12.5 INJECTION INTRAMUSCULAR; INTRAVENOUS ONCE AS NEEDED
Status: DISCONTINUED | OUTPATIENT
Start: 2020-11-23 | End: 2020-11-23 | Stop reason: HOSPADM

## 2020-11-23 RX ORDER — BUPIVACAINE HYDROCHLORIDE 2.5 MG/ML
INJECTION, SOLUTION EPIDURAL; INFILTRATION; INTRACAUDAL AS NEEDED
Status: DISCONTINUED | OUTPATIENT
Start: 2020-11-23 | End: 2020-11-23 | Stop reason: HOSPADM

## 2020-11-23 RX ORDER — FENTANYL CITRATE/PF 50 MCG/ML
50 SYRINGE (ML) INJECTION
Status: DISCONTINUED | OUTPATIENT
Start: 2020-11-23 | End: 2020-11-23 | Stop reason: HOSPADM

## 2020-11-23 RX ORDER — HYDROMORPHONE HCL/PF 1 MG/ML
0.5 SYRINGE (ML) INJECTION
Status: DISCONTINUED | OUTPATIENT
Start: 2020-11-23 | End: 2020-11-23 | Stop reason: HOSPADM

## 2020-11-23 RX ORDER — HYDROMORPHONE HCL/PF 1 MG/ML
0.2 SYRINGE (ML) INJECTION
Status: DISCONTINUED | OUTPATIENT
Start: 2020-11-23 | End: 2020-11-23 | Stop reason: HOSPADM

## 2020-11-23 RX ORDER — OXYCODONE HYDROCHLORIDE 5 MG/1
5 TABLET ORAL EVERY 4 HOURS PRN
Qty: 10 TABLET | Refills: 0 | Status: SHIPPED | OUTPATIENT
Start: 2020-11-23 | End: 2020-12-03

## 2020-11-23 RX ORDER — MIDAZOLAM HYDROCHLORIDE 2 MG/2ML
INJECTION, SOLUTION INTRAMUSCULAR; INTRAVENOUS AS NEEDED
Status: DISCONTINUED | OUTPATIENT
Start: 2020-11-23 | End: 2020-11-23

## 2020-11-23 RX ORDER — ONDANSETRON 2 MG/ML
INJECTION INTRAMUSCULAR; INTRAVENOUS AS NEEDED
Status: DISCONTINUED | OUTPATIENT
Start: 2020-11-23 | End: 2020-11-23

## 2020-11-23 RX ORDER — SODIUM CHLORIDE, SODIUM LACTATE, POTASSIUM CHLORIDE, CALCIUM CHLORIDE 600; 310; 30; 20 MG/100ML; MG/100ML; MG/100ML; MG/100ML
125 INJECTION, SOLUTION INTRAVENOUS CONTINUOUS
Status: DISCONTINUED | OUTPATIENT
Start: 2020-11-23 | End: 2020-11-23 | Stop reason: HOSPADM

## 2020-11-23 RX ORDER — METOCLOPRAMIDE HYDROCHLORIDE 5 MG/ML
10 INJECTION INTRAMUSCULAR; INTRAVENOUS ONCE AS NEEDED
Status: DISCONTINUED | OUTPATIENT
Start: 2020-11-23 | End: 2020-11-23 | Stop reason: HOSPADM

## 2020-11-23 RX ORDER — PROPOFOL 10 MG/ML
INJECTION, EMULSION INTRAVENOUS AS NEEDED
Status: DISCONTINUED | OUTPATIENT
Start: 2020-11-23 | End: 2020-11-23

## 2020-11-23 RX ORDER — LIDOCAINE HYDROCHLORIDE 10 MG/ML
INJECTION, SOLUTION EPIDURAL; INFILTRATION; INTRACAUDAL; PERINEURAL AS NEEDED
Status: DISCONTINUED | OUTPATIENT
Start: 2020-11-23 | End: 2020-11-23

## 2020-11-23 RX ORDER — FENTANYL CITRATE 50 UG/ML
INJECTION, SOLUTION INTRAMUSCULAR; INTRAVENOUS AS NEEDED
Status: DISCONTINUED | OUTPATIENT
Start: 2020-11-23 | End: 2020-11-23

## 2020-11-23 RX ORDER — LIDOCAINE HYDROCHLORIDE 10 MG/ML
0.5 INJECTION, SOLUTION EPIDURAL; INFILTRATION; INTRACAUDAL; PERINEURAL ONCE AS NEEDED
Status: COMPLETED | OUTPATIENT
Start: 2020-11-23 | End: 2020-11-23

## 2020-11-23 RX ADMIN — FENTANYL CITRATE 25 MCG: 50 INJECTION INTRAMUSCULAR; INTRAVENOUS at 11:30

## 2020-11-23 RX ADMIN — MIDAZOLAM 2 MG: 1 INJECTION INTRAMUSCULAR; INTRAVENOUS at 10:30

## 2020-11-23 RX ADMIN — LIDOCAINE HYDROCHLORIDE 50 MG: 10 INJECTION, SOLUTION EPIDURAL; INFILTRATION; INTRACAUDAL; PERINEURAL at 10:33

## 2020-11-23 RX ADMIN — CEFAZOLIN 2000 MG: 1 INJECTION, POWDER, FOR SOLUTION INTRAVENOUS at 10:56

## 2020-11-23 RX ADMIN — DEXAMETHASONE SODIUM PHOSPHATE 10 MG: 10 INJECTION, SOLUTION INTRAMUSCULAR; INTRAVENOUS at 10:40

## 2020-11-23 RX ADMIN — PROPOFOL 200 MG: 10 INJECTION, EMULSION INTRAVENOUS at 10:33

## 2020-11-23 RX ADMIN — Medication 50 MCG: at 12:20

## 2020-11-23 RX ADMIN — LIDOCAINE HYDROCHLORIDE 0.2 ML: 10 INJECTION, SOLUTION EPIDURAL; INFILTRATION; INTRACAUDAL; PERINEURAL at 08:59

## 2020-11-23 RX ADMIN — FENTANYL CITRATE 25 MCG: 50 INJECTION INTRAMUSCULAR; INTRAVENOUS at 11:01

## 2020-11-23 RX ADMIN — FENTANYL CITRATE 25 MCG: 50 INJECTION INTRAMUSCULAR; INTRAVENOUS at 11:17

## 2020-11-23 RX ADMIN — SODIUM CHLORIDE, SODIUM LACTATE, POTASSIUM CHLORIDE, AND CALCIUM CHLORIDE 125 ML/HR: .6; .31; .03; .02 INJECTION, SOLUTION INTRAVENOUS at 08:59

## 2020-11-23 RX ADMIN — FENTANYL CITRATE 50 MCG: 50 INJECTION INTRAMUSCULAR; INTRAVENOUS at 10:33

## 2020-11-23 RX ADMIN — SODIUM CHLORIDE, SODIUM LACTATE, POTASSIUM CHLORIDE, AND CALCIUM CHLORIDE: .6; .31; .03; .02 INJECTION, SOLUTION INTRAVENOUS at 10:30

## 2020-11-23 RX ADMIN — SODIUM CHLORIDE, SODIUM LACTATE, POTASSIUM CHLORIDE, AND CALCIUM CHLORIDE: .6; .31; .03; .02 INJECTION, SOLUTION INTRAVENOUS at 11:40

## 2020-11-23 RX ADMIN — FENTANYL CITRATE 25 MCG: 50 INJECTION INTRAMUSCULAR; INTRAVENOUS at 11:33

## 2020-11-23 RX ADMIN — ONDANSETRON 4 MG: 2 INJECTION INTRAMUSCULAR; INTRAVENOUS at 10:43

## 2020-12-03 ENCOUNTER — OFFICE VISIT (OUTPATIENT)
Dept: OBGYN CLINIC | Facility: HOSPITAL | Age: 20
End: 2020-12-03

## 2020-12-03 ENCOUNTER — HOSPITAL ENCOUNTER (OUTPATIENT)
Dept: RADIOLOGY | Facility: HOSPITAL | Age: 20
Discharge: HOME | End: 2020-12-03
Attending: ORTHOPAEDIC SURGERY
Payer: COMMERCIAL

## 2020-12-03 VITALS
DIASTOLIC BLOOD PRESSURE: 81 MMHG | HEART RATE: 85 BPM | SYSTOLIC BLOOD PRESSURE: 131 MMHG | BODY MASS INDEX: 36.34 KG/M2 | HEIGHT: 67 IN

## 2020-12-03 DIAGNOSIS — Z48.89 AFTERCARE FOLLOWING SURGERY: Primary | ICD-10-CM

## 2020-12-03 DIAGNOSIS — Z48.89 AFTERCARE FOLLOWING SURGERY: ICD-10-CM

## 2020-12-03 PROCEDURE — 73600 X-RAY EXAM OF ANKLE: CPT

## 2020-12-03 PROCEDURE — 99024 POSTOP FOLLOW-UP VISIT: CPT | Performed by: ORTHOPAEDIC SURGERY

## 2020-12-04 ENCOUNTER — OFFICE VISIT (OUTPATIENT)
Dept: PHYSICAL THERAPY | Facility: CLINIC | Age: 20
End: 2020-12-04
Payer: COMMERCIAL

## 2020-12-04 ENCOUNTER — DOCUMENTATION (OUTPATIENT)
Dept: BEHAVIORAL/MENTAL HEALTH CLINIC | Facility: CLINIC | Age: 20
End: 2020-12-04

## 2020-12-04 ENCOUNTER — TELEPHONE (OUTPATIENT)
Dept: BEHAVIORAL/MENTAL HEALTH CLINIC | Facility: CLINIC | Age: 20
End: 2020-12-04

## 2020-12-04 DIAGNOSIS — Z09 FRACTURE FOLLOW-UP: Primary | ICD-10-CM

## 2020-12-04 PROCEDURE — 97110 THERAPEUTIC EXERCISES: CPT | Performed by: PHYSICAL THERAPIST

## 2020-12-04 PROCEDURE — 97535 SELF CARE MNGMENT TRAINING: CPT | Performed by: PHYSICAL THERAPIST

## 2020-12-23 ENCOUNTER — OFFICE VISIT (OUTPATIENT)
Dept: PHYSICAL THERAPY | Facility: CLINIC | Age: 20
End: 2020-12-23
Payer: COMMERCIAL

## 2020-12-23 ENCOUNTER — APPOINTMENT (OUTPATIENT)
Dept: PHYSICAL THERAPY | Facility: CLINIC | Age: 20
End: 2020-12-23
Payer: COMMERCIAL

## 2020-12-23 DIAGNOSIS — Z09 FRACTURE FOLLOW-UP: Primary | ICD-10-CM

## 2020-12-23 PROCEDURE — 97535 SELF CARE MNGMENT TRAINING: CPT | Performed by: PHYSICAL THERAPIST

## 2021-01-11 ENCOUNTER — DOCUMENTATION (OUTPATIENT)
Dept: BEHAVIORAL/MENTAL HEALTH CLINIC | Facility: CLINIC | Age: 21
End: 2021-01-11

## 2021-01-11 ENCOUNTER — TELEPHONE (OUTPATIENT)
Dept: BEHAVIORAL/MENTAL HEALTH CLINIC | Facility: CLINIC | Age: 21
End: 2021-01-11

## 2021-01-11 NOTE — PROGRESS NOTES
Received an e-mail reporting that she has been discharged from her partial hospitalization program, and would like to return to services  The Client was contacted through e-mail and notified that I would call her to set up appointment time and date

## 2021-01-11 NOTE — TELEPHONE ENCOUNTER
(release in file) Attempted to contact the Client to reschedule an appointment mail box was full  Contacted the Client's mother and left a message asking her to have to client contact this therapist to schedule an appointment   Phone number was left for the [address] [place] [address] [phone]

## 2021-01-11 NOTE — TELEPHONE ENCOUNTER
Attempted to contact the Client to reschedule an appointment message could not be left due to mailbox being full

## 2021-01-18 ENCOUNTER — DOCUMENTATION (OUTPATIENT)
Dept: BEHAVIORAL/MENTAL HEALTH CLINIC | Facility: CLINIC | Age: 21
End: 2021-01-18

## 2021-01-18 DIAGNOSIS — F31.9 BIPOLAR 1 DISORDER (HCC): Primary | ICD-10-CM

## 2021-01-18 DIAGNOSIS — F33.2 SEVERE EPISODE OF RECURRENT MAJOR DEPRESSIVE DISORDER, WITHOUT PSYCHOTIC FEATURES (HCC): Primary | ICD-10-CM

## 2021-01-18 RX ORDER — LITHIUM CARBONATE 300 MG/1
300 CAPSULE ORAL 2 TIMES DAILY WITH MEALS
Qty: 60 CAPSULE | Refills: 5 | Status: SHIPPED | OUTPATIENT
Start: 2021-01-18

## 2021-01-18 NOTE — PROGRESS NOTES
1/18/2021 Client was notified that annual paperwork/releases  will be sent to her home to be completed  She was asked to send the paperwork back in the envelope provided back to the [address] [place] [address]

## 2021-01-18 NOTE — PROGRESS NOTES
01/18/2021 Yearly paperwork was sent via the [address] [facility] Postal service to the Client's home address on file, to be completed and returned in a self address envelope as discussed in the packet letter and by prior communication with the Client

## 2021-02-08 RX ORDER — TRAZODONE HYDROCHLORIDE 50 MG/1
TABLET ORAL
COMMUNITY
Start: 2020-12-08

## 2021-02-09 ENCOUNTER — OFFICE VISIT (OUTPATIENT)
Dept: OBGYN CLINIC | Facility: HOSPITAL | Age: 21
End: 2021-02-09

## 2021-02-09 VITALS
HEIGHT: 67 IN | HEART RATE: 83 BPM | DIASTOLIC BLOOD PRESSURE: 82 MMHG | WEIGHT: 253.6 LBS | BODY MASS INDEX: 39.8 KG/M2 | SYSTOLIC BLOOD PRESSURE: 144 MMHG

## 2021-02-09 DIAGNOSIS — Z98.890 HISTORY OF REMOVAL OF RETAINED HARDWARE: Primary | ICD-10-CM

## 2021-02-09 DIAGNOSIS — Z98.890 S/P ORIF (OPEN REDUCTION INTERNAL FIXATION) FRACTURE: ICD-10-CM

## 2021-02-09 DIAGNOSIS — Z87.81 S/P ORIF (OPEN REDUCTION INTERNAL FIXATION) FRACTURE: ICD-10-CM

## 2021-02-09 PROCEDURE — 99024 POSTOP FOLLOW-UP VISIT: CPT | Performed by: ORTHOPAEDIC SURGERY

## 2021-02-09 NOTE — PROGRESS NOTES
Assessment:   Diagnosis ICD-10-CM Associated Orders   1  History of removal of retained hardware  Z98 890     left ankle hardware removal 11/23/2020   2  S/P ORIF (open reduction internal fixation) fracture  Z98 890     Z87 81      left ankle ORIF, 10/4/2020       Plan:  Status post left ankle ORIF, 10/04/2020 with removal of syndesmotic screw 11/23/2020  Patient overall is doing very well  May continue to have occasional discomfort and or swelling for several months  Weightbearing activities as tolerated  No restrictions  To do next visit:  Return for re-check on an as needed basis (PRN)  The above stated was discussed in layman's terms and the patient expressed understanding  All questions were answered to the patient's satisfaction  Scribe Attestation    I,:  Joe Marcelino am acting as a scribe while in the presence of the attending physician :       I,:  Saundra Jolley MD personally performed the services described in this documentation    as scribed in my presence :             Subjective:   Raúl Barroso is a 23 y o  female who presents repeat evaluation of her left ankle  She is status post ORIF 10/04/2020 with removal of syndesmotic screw 11/23/2020  Patient since her last visit has return to work  She notes some mild soreness and achiness after working and standing all day  She has improvement of ambulating stairs  Overall she is doing well and has no complaints today        Review of systems negative unless otherwise specified in HPI  Review of Systems    Past Medical History:   Diagnosis Date    ADHD     Anxiety     Depression     Polycystic ovaries        Past Surgical History:   Procedure Laterality Date    ORIF TIBIA & FIBULA FRACTURES Left 10/4/2020    Procedure: OPEN REDUCTION W/ INTERNAL FIXATION (ORIF) ANKLE including syndysmotic fixation;  Surgeon: Saundra Jolley MD;  [facility];  Service: Orthopedics    GA REMOVAL DEEP IMPLANT Left 11/23/2020    Procedure: REMOVAL HARDWARE ANKLE;  Surgeon: Rebel Queen MD;  [facility];  Service: Orthopedics    TONSILLECTOMY      WISDOM TOOTH EXTRACTION         Family History   Problem Relation Age of Onset    Kidney disease Mother     Polycystic ovary syndrome Mother     No Known Problems Father     No Known Problems Sister     No Known Problems Brother     Breast cancer additional onset Maternal Grandmother     Breast cancer additional onset Maternal Aunt     Thyroid disease Maternal Aunt     No Known Problems Brother     No Known Problems Sister        Social History     Occupational History    Not on file   Tobacco Use    Smoking status: Never Smoker    Smokeless tobacco: Never Used   Substance and Sexual Activity    Alcohol use: Yes     Frequency: 2-4 times a month    Drug use: Yes     Types: Marijuana     Comment: dailyadvised not to use prior to procedure    Sexual activity: Not Currently         Current Outpatient Medications:     gabapentin (NEURONTIN) 100 mg capsule, Take 1 capsule (100 mg total) by mouth every 8 (eight) hours, Disp: 90 capsule, Rfl: 0    lithium carbonate 300 mg capsule, Take 1 capsule (300 mg total) by mouth 2 (two) times a day with meals, Disp: 60 capsule, Rfl: 5    Misc  Devices (Commode Bedside) MISC, by Does not apply route 3 (three) times a day, Disp: 30 each, Rfl: 0    Misc  Devices (Rolling Walker/[employer]) MISC, 1 Product by Does not apply route daily, Disp: 1 each, Rfl: 0    traZODone (DESYREL) 50 mg tablet, , Disp: , Rfl:     No Known Allergies         Vitals:    02/09/21 1503   BP: 144/82   Pulse: 83       Objective:                    Left Ankle Exam     Tenderness   The patient is experiencing no tenderness  Swelling: mild    Range of Motion   The patient has normal left ankle ROM  Muscle Strength   The patient has normal left ankle strength      Other   Erythema: absent  Sensation: normal    Comments:    Healed incision laterally            Diagnostics, reviewed and taken today if performed as documented:    None performed        Procedures, if performed today:    Procedures    None performed      Portions of the record may have been created with voice recognition software  Occasional wrong word or "sound a like" substitutions may have occurred due to the inherent limitations of voice recognition software  Read the chart carefully and recognize, using context, where substitutions have occurred

## 2021-02-19 ENCOUNTER — TELEMEDICINE (OUTPATIENT)
Dept: BEHAVIORAL/MENTAL HEALTH CLINIC | Facility: CLINIC | Age: 21
End: 2021-02-19
Payer: COMMERCIAL

## 2021-02-19 DIAGNOSIS — F31.32 MODERATE BIPOLAR I DISORDER, MOST RECENT EPISODE DEPRESSED (HCC): Primary | ICD-10-CM

## 2021-02-19 PROCEDURE — 90837 PSYTX W PT 60 MINUTES: CPT | Performed by: SOCIAL WORKER

## 2021-02-19 NOTE — PSYCH
Wash Denver  2000         Date of Initial Treatment Plan: 11/05/20  Date of Current Treatment Plan: 4829/11     Treatment Plan Number: 2nd treatment Plan      Strengths/Personal Resources for Self Care: The Client reported that she has a supportive family of origin, and peer relationships  She is actively employment within the community, and is attending [employer] in  She is being followed by Dr Monie Lorenz for her physical health through [facility], Dr Mandy Rodriguez through [facility] in [facility], and with this therapist for mental health therapy  Diogo Lu MSW LCSW at [place] [address]       Diagnosis:   1  Moderate bipolar I disorder, most recent episode depressed ([facility] [address] )            Area of Needs: The Client reported that she would like to address her depression and anxity, that is effecting her different relationships and environments  It is hoped that The Client will achieve or maintain maximum functional capacity in performing daily activizes, taking into account both the functional capacity of the individual and those functional capacities appropriate for the individuals of the same age  Reduce or ameliorate the physical mental, behavioral, or developmental effects of an illness, condition, injury or disability   Present treatment plan will cover 4 months or 12 visits which ever comes first                Long Term Goal 1: The Client's depression score will decrease from 20 to 10 or less on the depression screening PHQ-9 (Client's score on 2/19/21 17 decrease 3 points)      Target Date: 11/05/2021  Completion Date:    Short Term Objectives for Goal 1:The Client will learn 4 copings skill to prevent  Her isolating due To her depression within different enviroments  (emerging)             Long Term Goal 2:  The Client's anxiety score will decrease from 17 to 10 or less on her anxiety screening SUSI-7 (score increased by 1 on 2/19/21)        Target Date: 11/05/2021  Completion Date:      Short Term Objectives for Goal 2: The Client will be able to verbalize 5 triggers to her anxiety  (emerging)           Long Term Goal # 3: The Client will be active within the community 3 out of 7 days       Target Date: 11/05/2021  Completion Date:            GOAL 1: Modality: The person(s) responsible for carrying out the plan is  the client and this therapist Genoveva Amend [facility]     GOAL 2: Modality: The person(s) responsible for carrying out the plan is  the client and this therapist Genoveva Amend [facility]      GOAL 3: Modality: The person(s) responsible for carrying out the plan is  The [address]: Diagnosis and Treatment Plan explained to Dara Davenportes relates understanding diagnosis and is agreeable to Treatment Plan          Client Comments : Please share your thoughts, feelings, need and/or experiences regarding your treatment plan:    The Client's short term treatment plan will be reviewed and or completed on or before 06/19/2021  __________________________________________________________________     __________________________________________________________________     __________________________________________________________________     __________________________________________________________________     _______________________________________                 Patient signature, Date Time: __________________________________________        Physician cosigner signature, Date, Time: ________________________________

## 2021-02-19 NOTE — PSYCH
Psychotherapy Provided: Individual Psychotherapy 60  minutes 10:00 Am to 10:54 AM          Goals addressed in session:(Creation of 2nd treatment Plan) The Client reported that she is returning to individual mental health therapy after being discharged from her partial hospitalization program  She reported that she has returned to her apartment and will be returning to her [employer] in the Fall  During the session her conducted screenings on     Interventions: Supportive Therapy, Strengths Therapy,  and Cognitive Behavioral Therapy where the treatment modalities utilized during the session  Assessment and Plan: The Client presented an anxious mood that was congruent in effect  She was alert, goal directed and participated with prompts during the session  The Client was oriented to person, place, time, situation, and reported no suicidal/homicidal ideation, plan, or intent  As team we conducted the depression screening PHQ-9 with the Client's score decreasing by 3 points to 17 and on her anxiety screening SUSI-7 the client's score increased by 1 point to 18  During the session we created the Client's recovery treatment plan, which will cover 12 visits or 4 months which ever comes first  As her home committment the client will completed worksheet on personal recovery medicine  Next scheduled appointment was set for 2 weeks  Pain:      PSYCH MENTAL STATUS PAIN :5 as reported by the Client     PHYSICAL PAIN SCALE NUMBERS:3 as reported by the client due ankle pain    Current suicide risk : Low/The Client was given phone number for the [facility] [phone]  Phone number for [address] [place] [address] was given to the Client/ [address] [place] [address]: Diagnosis and Treatment Plan explained to Jailyn Castorena, Jailyn Castorena  relates understanding diagnosis and is agreeable to Treatment Plan  Yes    Virtual Regular Visit      Assessment/Plan:    Problem List Items Addressed This Visit     None      Visit Diagnoses     Moderate bipolar I disorder, most recent episode depressed ([facility] [address] )    -  Primary               Reason for visit is  Creation of the Client's 2nd treatment plan    Encounter provider TOMA Mora    Provider located at [address] [place] [id number]      Recent Visits  No visits were found meeting these conditions  Showing recent visits within past 7 days and meeting all other requirements     Today's Visits  Date Type Provider Dept   02/19/21 Telemedicine TOMA Mora [facility]   Showing today's visits and meeting all other requirements     Future Appointments  No visits were found meeting these conditions  Showing future appointments within next 150 days and meeting all other requirements        The patient was identified by name and date of birth  Aleja Paz was informed that this is a telemedicine visit and that the visit is being conducted through [employer] and patient was informed that this is not a secure, HIPAA-compliant platform  She agrees to proceed     My office door was closed  No one else was in the room  She acknowledged consent and understanding of privacy and security of the video platform  The patient has agreed to participate and understands they can discontinue the visit at any time  Patient is aware this is a billable service  Adarsh George is a 23 y o  female , who was seen for individual mental health therapy         HPI     Past Medical History:   Diagnosis Date    ADHD     Anxiety     Depression     Polycystic ovaries        Past Surgical History:   Procedure Laterality Date    ORIF TIBIA & FIBULA FRACTURES Left 10/4/2020    Procedure: OPEN REDUCTION W/ INTERNAL FIXATION (ORIF) ANKLE including syndysmotic fixation;  Surgeon: Shruthi Tom MD; [facility];  Service: Orthopedics    MA REMOVAL DEEP IMPLANT Left 11/23/2020    Procedure: REMOVAL HARDWARE ANKLE;  Surgeon: Gilbert Giordano MD;  [facility];  Service: Orthopedics    TONSILLECTOMY      WISDOM TOOTH EXTRACTION         Current Outpatient Medications   Medication Sig Dispense Refill    gabapentin (NEURONTIN) 100 mg capsule Take 1 capsule (100 mg total) by mouth every 8 (eight) hours 90 capsule 0    lithium carbonate 300 mg capsule Take 1 capsule (300 mg total) by mouth 2 (two) times a day with meals 60 capsule 5    Misc  Devices (Commode Bedside) MISC by Does not apply route 3 (three) times a day 30 each 0    Misc  Devices ([employer]) MISC 1 Product by Does not apply route daily 1 each 0    traZODone (DESYREL) 50 mg tablet        No current facility-administered medications for this visit  No Known Allergies    Review of Systems    Video Exam    There were no vitals filed for this visit  Physical Exam     I spent 60 minutes directly with the patient during this visit      [address] acknowledges that she has consented to an online visit or consultation  She understands that the online visit is based solely on information provided by her, and that, in the absence of a face-to-face physical evaluation by the physician, the diagnosis she receives is both limited and provisional in terms of accuracy and completeness  This is not intended to replace a full medical face-to-face evaluation by the physician  Luna Jacob understands and accepts these terms

## 2021-03-05 ENCOUNTER — TELEMEDICINE (OUTPATIENT)
Dept: BEHAVIORAL/MENTAL HEALTH CLINIC | Facility: CLINIC | Age: 21
End: 2021-03-05
Payer: COMMERCIAL

## 2021-03-05 DIAGNOSIS — F31.32 MODERATE BIPOLAR I DISORDER, MOST RECENT EPISODE DEPRESSED (HCC): Primary | ICD-10-CM

## 2021-03-05 PROCEDURE — 90837 PSYTX W PT 60 MINUTES: CPT | Performed by: SOCIAL WORKER

## 2021-03-05 NOTE — PSYCH
Psychotherapy Provided: Individual Psychotherapy 60  minutes 09:55 Am to 10:55 Am It was my intent to perform this visit via video technology, but the patient was not able to do a video connection, so the visit was completed via audio telephone only  Goals addressed in session:(Long Term Goal Number One) The Client reported that she has started dating which has helped with her depression and anxiety  " I had a great day with Jen Berumen it made me feel good" During the session we explored perfection vs striving for excellence  It is hoped that by addressing her weaknesses this will act as a preventive measure against the possible need for higher level of care and services  Interventions: Supportive Therapy, Strengths Therapy,  and Cognitive Behavioral Therapy where the treatment modalities utilized during the session  Assessment and Plan: The Client presented a depressed anxious mood that was congruent in effect  She was alert, goal directed and participated with prompts during the session  The Client was oriented to person, place, time, situation, and reported no suicidal/homicidal ideation, plan, or intent  As team we explored perfectionism vs Striving for Excellence, and the effects on The Client's different relationships and environments  The Client was able to verbalize that her fear of failure has led to her depression and fear of rejection  In addressing her depression and anxiety the Client and this therapist practiced coping skills grounding with the client reporting feeling calmer  As her home commitment the Client and her support team set for her to practice grounding when presented with anxiety and or depression  Next scheduled appointment was set for 2 week       Pain:      PSYCH MENTAL STATUS PAIN :4 as reported by Client     PHYSICAL PAIN SCALE NUMBERS:5 as reported by the Client due to ankle pain    Current suicide risk : Low/Phone number for National Suicide Prevention Life Line was given to the Client/ [phone]  The Client was given phone number for the [facility] [phone]  Behavioral Health Treatment Plan [facility]: Diagnosis and Treatment Plan explained to Gaalba Harjinder  relates understanding diagnosis and is agreeable to Treatment Plan  Yes  Virtual Brief Visit    Assessment/Plan:    Problem List Items Addressed This Visit     None      Visit Diagnoses     Moderate bipolar I disorder, most recent episode depressed ([address] )    -  Primary                Reason for visit is No chief complaint on file  Encounter provider TOMA Baltazar    Provider located at [address] [place] [address] [address]  [place] [address]    Recent Visits  No visits were found meeting these conditions  Showing recent visits within past 7 days and meeting all other requirements     Today's Visits  Date Type Provider Dept   03/05/21 Telemedicine TOMA Baltazar [facility]   Showing today's visits and meeting all other requirements     Future Appointments  No visits were found meeting these conditions  Showing future appointments within next 150 days and meeting all other requirements        After connecting through telephone, the patient was identified by name and date of birth  Judge Smith was informed that this is a telemedicine visit and that the visit is being conducted through telephone  My office door was closed  No one else was in the room  She acknowledged consent and understanding of privacy and security of the platform  The patient has agreed to participate and understands she can discontinue the visit at any time  Patient is aware this is a billable service  Ronald Rinne is a 23 y o  female , was seen for individual mental health therapy    HPI     Past Medical History:   Diagnosis Date    ADHD     Anxiety     Depression     Polycystic ovaries        Past Surgical History:   Procedure Laterality Date    ORIF TIBIA & FIBULA FRACTURES Left 10/4/2020    Procedure: OPEN REDUCTION W/ INTERNAL FIXATION (ORIF) ANKLE including syndysmotic fixation;  Surgeon: Oliva Lloyd MD;  [facility];  Service: Orthopedics    AK REMOVAL DEEP IMPLANT Left 11/23/2020    Procedure: REMOVAL HARDWARE ANKLE;  Surgeon: Oliva Lloyd MD;  [facility];  Service: Orthopedics    TONSILLECTOMY      WISDOM TOOTH EXTRACTION         Current Outpatient Medications   Medication Sig Dispense Refill    gabapentin (NEURONTIN) 100 mg capsule Take 1 capsule (100 mg total) by mouth every 8 (eight) hours 90 capsule 0    lithium carbonate 300 mg capsule Take 1 capsule (300 mg total) by mouth 2 (two) times a day with meals 60 capsule 5    Misc  Devices (Commode Bedside) MISC by Does not apply route 3 (three) times a day 30 each 0    Misc  Devices ([employer]) MISC 1 Product by Does not apply route daily 1 each 0    traZODone (DESYREL) 50 mg tablet        No current facility-administered medications for this visit  No Known Allergies    Review of Systems    There were no vitals filed for this visit  I spent 60 minutes directly with the patient during this visit    [address] acknowledges that she has consented to an online visit or consultation  She understands that the online visit is based solely on information provided by her, and that, in the absence of a face-to-face physical evaluation by the physician, the diagnosis she receives is both limited and provisional in terms of accuracy and completeness  This is not intended to replace a full medical face-to-face evaluation by the physician  Ry Perkins understands and accepts these terms

## 2021-03-18 ENCOUNTER — TELEMEDICINE (OUTPATIENT)
Dept: BEHAVIORAL/MENTAL HEALTH CLINIC | Facility: CLINIC | Age: 21
End: 2021-03-18
Payer: COMMERCIAL

## 2021-03-18 ENCOUNTER — TELEPHONE (OUTPATIENT)
Dept: BEHAVIORAL/MENTAL HEALTH CLINIC | Facility: CLINIC | Age: 21
End: 2021-03-18

## 2021-03-18 DIAGNOSIS — F31.32 MODERATE BIPOLAR I DISORDER, MOST RECENT EPISODE DEPRESSED (HCC): Primary | ICD-10-CM

## 2021-03-18 PROCEDURE — 90837 PSYTX W PT 60 MINUTES: CPT | Performed by: SOCIAL WORKER

## 2021-03-18 NOTE — PSYCH
Psychotherapy Provided: Individual Psychotherapy 60  minutes 11:55 Am to 12:50 PM          Goals addressed in session:(Long Term Goal Number 1) The Client reported that she has been spending time with her peers, and has been able to set healthy boundaries  " I am really taking are of my own needs to protect my boundaries" During the session we explored personal boundaries and the effects on her different relationships and environments  It is hoped that by addressing her weaknesses this will act as a preventive measure against the possible need for higher level of care and services  Interventions: Supportive Therapy, Strengths Therapy,  and Cognitive Behavioral Therapy where the treatment modalities utilized during the session  Assessment and Plan: The Client presented a depressed anxious mood that was congruent in effect she was alert, goal directed and participated with prompts during the session  The Client was oriented to person, place, time, situation, and reported no suicidal/homicidal ideation, plan, or intent  As team we explored the Client's personal boundaries and common traits of ridged, porous, and heathy boundaries  The Client was able to verbalize improved boundaries with her increased mental health awareness, but still issues within her emotional boundaries  Coping skills were explored during the session  As her home commitment the Client will practice her coping skills when presented with anxiety and depression  Next scheduled appointment was set for 3 week  Pain:      PSYCH MENTAL STATUS PAIN : 6 as reported by the Client     PHYSICAL PAIN SCALE NUMBERS:3 as reported due to ankle pain    Current suicide risk : Low/Phone number for [facility] was given  to the Client [phone]  The Client was given phone number for the [facility] [phone]    Phone number for [address] [place] [address] was given to the Client/ [phone] Behavioral Health Treatment Plan [employer]: Diagnosis and Treatment Plan explained to Alexus Ely  relates understanding diagnosis and is agreeable to Treatment Plan  Yes  Virtual Regular Visit      Assessment/Plan:    Problem List Items Addressed This Visit     None      Visit Diagnoses     Moderate bipolar I disorder, most recent episode depressed ([address] )    -  Primary               Reason for visit is No chief complaint on file  Encounter provider Velma KeshawnTOMA    Provider located at [place] [address] PSYCH  [address] [facility] [address]      Recent Visits  No visits were found meeting these conditions  Showing recent visits within past 7 days and meeting all other requirements     Today's Visits  Date Type Provider Dept   03/18/21 Telephone Velma Keshawn [facility]   03/18/21 Telemedicine TOMA [facility]obedo [facility]   Showing today's visits and meeting all other requirements     Future Appointments  No visits were found meeting these conditions  Showing future appointments within next 150 days and meeting all other requirements        The patient was identified by name and date of birth  Naye Calixto was informed that this is a telemedicine visit and that the visit is being conducted through [employer] [place] [address] and patient was informed that this is not a secure, HIPAA-compliant platform  She agrees to proceed     My office door was closed  No one else was in the room  She acknowledged consent and understanding of privacy and security of the video platform  The patient has agreed to participate and understands they can discontinue the visit at any time  Patient is aware this is a billable service  Luann Bowser is a 23 y o  female , who was seen for individual mental health therapy          HPI     Past Medical History:   Diagnosis Date    ADHD     Anxiety     Depression     Polycystic ovaries        Past Surgical History:   Procedure Laterality Date    ORIF TIBIA & FIBULA FRACTURES Left 10/4/2020    Procedure: OPEN REDUCTION W/ INTERNAL FIXATION (ORIF) ANKLE including syndysmotic fixation;  Surgeon: Gogo Pandey MD;  [facility];  Service: Orthopedics    [place] REMOVAL DEEP IMPLANT Left 11/23/2020    Procedure: REMOVAL HARDWARE ANKLE;  Surgeon: Gogo Pandey MD;  [facility];  Service: Orthopedics    TONSILLECTOMY      WISDOM TOOTH EXTRACTION         Current Outpatient Medications   Medication Sig Dispense Refill    gabapentin (NEURONTIN) 100 mg capsule Take 1 capsule (100 mg total) by mouth every 8 (eight) hours 90 capsule 0    lithium carbonate 300 mg capsule Take 1 capsule (300 mg total) by mouth 2 (two) times a day with meals 60 capsule 5    Misc  Devices (Commode Bedside) MISC by Does not apply route 3 (three) times a day 30 each 0    Misc  Devices ([employer]) MISC 1 Product by Does not apply route daily 1 each 0    traZODone (DESYREL) 50 mg tablet        No current facility-administered medications for this visit  No Known Allergies    Review of Systems    Video Exam    There were no vitals filed for this visit  Physical Exam     I spent 60 minutes directly with the patient during this visit      [address] acknowledges that she has consented to an online visit or consultation  She understands that the online visit is based solely on information provided by her, and that, in the absence of a face-to-face physical evaluation by the physician, the diagnosis she receives is both limited and provisional in terms of accuracy and completeness  This is not intended to replace a full medical face-to-face evaluation by the physician  Liang Bowers understands and accepts these terms

## 2021-03-24 ENCOUNTER — TELEPHONE (OUTPATIENT)
Dept: PSYCHIATRY | Facility: CLINIC | Age: 21
End: 2021-03-24

## 2021-03-24 NOTE — TELEPHONE ENCOUNTER
Behavorial Health Outpatient Intake Questions    Referred by: PCP    Please advised interviewee that they need to answer all questions truthfully to allow for best care and any misrepresentations of information may affect their ability to be seen at this clinic   => Was this discussed? Yes     [facility]     Presenting Problem (in patient's words): Medication mgmt to regulate medication diagnosed with bipolar II and borderline personality d/o  Are there any developmental disabilities? ? If yes, can they speak to you on the phone? If they are too limited to speak to you on phone, refer out Yes ADHD     Are you taking any psychiatric medications? Yes    => If yes, who prescribes? If yes, are they injectable medications? Lithium 300 mg twice, Gabapentin 100 mg,     Does the patient have a language barrier or hearing impairment? No    Have you been treated at [facility] by a therapist or a doctor in the past? If yes, who? Yes [place]    Has the patient been hospitalized for mental health? No   If yes, how long ago was last hospitalization and where was it? Do you actively use alcohol or marijuana or illegal substances? If yes, what and how much - refer out to Drug and alcohol treatment if use is excessive or daily use of illegal substances No concerns of substance abuse are reported  Do you have a community treatment team or ? No    Legal History-     Does the patient have any history of arrests, intermediate/detention time, or DUIs? No  If Yes-  1) What types of charges? 2) When were they last incarcerated? 3) Are they currently on parole or probation? Minor Child-    Who has custody of the child? Is there a custody agreement? If there is a custody agreement remind parent that they must bring a copy to the first appt or they will not be seen       Intake Team, please check with provider before scheduling if flags come up such as:  - complex case  - legal history (other than DUI)  - communication barrier concerns are present  - if, in your judgment, this needs further review    ACCEPTED as a patient Yes Appointment Date: Thursday July 8th,2021 at 1:00pm with Narendra NOE    Referred Elsewhere? No    Name of Insurance Co: [employer] #[id number]  Insurance Phone #  If ins is primary or secondary Primary  If patient is a minor, parents information such as Name, D  O B of guarantor

## 2021-03-31 ENCOUNTER — TELEMEDICINE (OUTPATIENT)
Dept: BEHAVIORAL/MENTAL HEALTH CLINIC | Facility: CLINIC | Age: 21
End: 2021-03-31
Payer: COMMERCIAL

## 2021-03-31 DIAGNOSIS — F31.32 MODERATE BIPOLAR I DISORDER, MOST RECENT EPISODE DEPRESSED (HCC): Primary | ICD-10-CM

## 2021-03-31 PROCEDURE — 90837 PSYTX W PT 60 MINUTES: CPT | Performed by: SOCIAL WORKER

## 2021-03-31 NOTE — PSYCH
Psychotherapy Provided: Individual Psychotherapy 60  minutes 1:58 Pm to 2:51 Pm It was my intent to perform this visit via video technology, but the patient was not able to do a video connection, so the visit was completed via audio telephone only  Goals addressed in session:(Long Term Goal Number One) The Client reported that she had a horrible birthday, which she spent with her Mother  " We had a horrible argument on my birthday, it ruined my birthday" She reported that she was recently was tested for COVID due to multiple symptoms being present  During the session we explored her personal boundaries and the effects on her mental health  It is hoped that by addressing her weaknesses this will act as a preventive measure against the possible need for higher level of care and services  Interventions: Supportive Therapy, Strengths Therapy,  and Cognitive Behavioral Therapy where the treatment modalities utilized during the session  Assessment and Plan:The Client presented a depressed anxious mood that was congruent in effect  She was alert, goal directed an participated with prompts during the session  The Client was oriented to person, place, time, situation, and reported no suicidal/homicidal ideation, plan, or intent  As team we explored the client's personal boundaries and common traits of ridged porous and healthy boundaries  The Client was able to verbalize that her boundaries have become healthy but she has still issue problems with emotional and material boundaries  As team the Client and her support team set for the Client to practice her coping skills when presented with anxiety and depression  Next scheduled appointment was set for 2 weeks       Pain:      PSYCH MENTAL STATUS PAIN :5 as reported by the Client     PHYSICAL PAIN SCALE NUMBERS:7 as reported by the Client  Due to possible COVID exposure     Current suicide risk : Low/Phone number for National Suicide Prevention Life Line was given to the Client/ [phone]  The Client was given phone number for the [facility] [phone]  Behavioral Health Treatment Plan [facility]: Diagnosis and Treatment Plan explained to Micheline Rao  relates understanding diagnosis and is agreeable to Treatment Plan  Yes  Virtual Brief Visit    Assessment/Plan:    Problem List Items Addressed This Visit     None      Visit Diagnoses     Moderate bipolar I disorder, most recent episode depressed ([address] )    -  Primary                Reason for visit is No chief complaint on file  Encounter provider TOMA Fraga    Provider located at [address] [place] [address] [address]  [place] [address]    Recent Visits  No visits were found meeting these conditions  Showing recent visits within past 7 days and meeting all other requirements     Future Appointments  No visits were found meeting these conditions  Showing future appointments within next 150 days and meeting all other requirements        After connecting through telephone, the patient was identified by name and date of birth  Inna Ward was informed that this is a telemedicine visit and that the visit is being conducted through telephone  My office door was closed  No one else was in the room  She acknowledged consent and understanding of privacy and security of the platform  The patient has agreed to participate and understands she can discontinue the visit at any time  Patient is aware this is a billable service  Cary Melo is a 24 y o  female , who was seen for individual mental health therapy    HPI     Past Medical History:   Diagnosis Date    ADHD     Anxiety     Depression     Polycystic ovaries        Past Surgical History:   Procedure Laterality Date    ORIF TIBIA & FIBULA FRACTURES Left 10/4/2020    Procedure: OPEN REDUCTION W/ INTERNAL FIXATION (ORIF) ANKLE including syndysmotic fixation;  Surgeon: Verito Aquino MD;  [facility];  Service: Orthopedics    [place] REMOVAL DEEP IMPLANT Left 11/23/2020    Procedure: REMOVAL HARDWARE ANKLE;  Surgeon: Verito Aquino MD;  [facility];  Service: Orthopedics    TONSILLECTOMY      WISDOM TOOTH EXTRACTION         Current Outpatient Medications   Medication Sig Dispense Refill    gabapentin (NEURONTIN) 100 mg capsule Take 1 capsule (100 mg total) by mouth every 8 (eight) hours 90 capsule 0    lithium carbonate 300 mg capsule Take 1 capsule (300 mg total) by mouth 2 (two) times a day with meals 60 capsule 5    Misc  Devices (Commode Bedside) MISC by Does not apply route 3 (three) times a day 30 each 0    Misc  Devices ([employer]) MISC 1 Product by Does not apply route daily 1 each 0    traZODone (DESYREL) 50 mg tablet        No current facility-administered medications for this visit  No Known Allergies    Review of Systems    There were no vitals filed for this visit  I spent 60 minutes directly with the patient during this visit    [address] acknowledges that she has consented to an online visit or consultation  She understands that the online visit is based solely on information provided by her, and that, in the absence of a face-to-face physical evaluation by the physician, the diagnosis she receives is both limited and provisional in terms of accuracy and completeness  This is not intended to replace a full medical face-to-face evaluation by the physician  Eliot Teran understands and accepts these terms

## 2021-04-14 ENCOUNTER — TELEMEDICINE (OUTPATIENT)
Dept: BEHAVIORAL/MENTAL HEALTH CLINIC | Facility: CLINIC | Age: 21
End: 2021-04-14
Payer: COMMERCIAL

## 2021-04-14 DIAGNOSIS — F31.32 MODERATE BIPOLAR I DISORDER, MOST RECENT EPISODE DEPRESSED (HCC): Primary | ICD-10-CM

## 2021-04-14 PROCEDURE — 90837 PSYTX W PT 60 MINUTES: CPT | Performed by: SOCIAL WORKER

## 2021-04-14 NOTE — PSYCH
Psychotherapy Provided: Individual Psychotherapy 60  minutes 12:00 Pm to 12:57 Pm It was my intent to perform this visit via video technology, but the patient was not able to do a video connection, so the visit was completed via audio telephone only  Goals addressed in session:(Long Term Goal Number One) The Client reported that she is not returning to school, and is  looking to work to "find myself" The Client was able to verbalize that she feels lost  During the session we explored the wheel of life and the effects on her different relationships and environments  It is hoped that by addressing her weaknesses this will act as a preventive measure against the possible need for higher level of care and services  Interventions: Supportive Therapy, Strengths Therapy,  and Cognitive Behavioral Therapy where the treatment modalities utilized during the session  Assessment and Plan:The Client presented an anxious mood that was congruent in effect  She was alert, goal directed and participated with prompts during the session  The Client was oriented to person, place, time, situation, and reported no suicidal/homicidal ideation, plan, or intent  As team we explored and processed the 8 major areas of the Client's in the areas of family/friends, health, physical environments, romance, career, money, fun and recreation, and personal growth  The Client was able to verbalize weaknesses as being physical environment, career and money  The Client was an active team member during the session  As her home commitment will practice her coping skills when presented with anxiety and or depression  Next scheduled appointment was set for 3 weeks       Pain:      PSYCH MENTAL STATUS PAIN :7 as reported by the Client     PHYSICAL PAIN SCALE NUMBERS:5 as reported by the Client     Current suicide risk : Low/Phone number for National Suicide Prevention Life Line was given to the Client/ [phone]  The Client was given phone number for the [facility] [phone]  Behavioral Health Treatment Plan [facility]: Diagnosis and Treatment Plan explained to Tim Mckeon  relates understanding diagnosis and is agreeable to Treatment Plan  Yes  Virtual Brief Visit    Assessment/Plan:    Problem List Items Addressed This Visit     None      Visit Diagnoses     Moderate bipolar I disorder, most recent episode depressed ([address] )    -  Primary                Reason for visit is No chief complaint on file  Encounter provider TOMA Beckman    Provider located at [address] [place] [id number]    Recent Visits  No visits were found meeting these conditions  Showing recent visits within past 7 days and meeting all other requirements     Future Appointments  No visits were found meeting these conditions  Showing future appointments within next 150 days and meeting all other requirements        After connecting through telephone, the patient was identified by name and date of birth  Shan Salas was informed that this is a telemedicine visit and that the visit is being conducted through telephone  My office door was closed  No one else was in the room  She acknowledged consent and understanding of privacy and security of the platform  The patient has agreed to participate and understands she can discontinue the visit at any time  Patient is aware this is a billable service  Domonique Lindo is a 24 y o  female , who was seen for individual mental health therapy    HPI     Past Medical History:   Diagnosis Date    ADHD     Anxiety     Depression     Polycystic ovaries        Past Surgical History:   Procedure Laterality Date    ORIF TIBIA & FIBULA FRACTURES Left 10/4/2020    Procedure: OPEN REDUCTION W/ INTERNAL FIXATION (ORIF) ANKLE including syndysmotic fixation;  Surgeon: Ramirez Cervantes Olvin Florez MD;  [facility];  Service: Orthopedics    [place] REMOVAL DEEP IMPLANT Left 11/23/2020    Procedure: REMOVAL HARDWARE ANKLE;  Surgeon: Tani Sage MD;  [facility];  Service: Orthopedics    TONSILLECTOMY      WISDOM TOOTH EXTRACTION         Current Outpatient Medications   Medication Sig Dispense Refill    gabapentin (NEURONTIN) 100 mg capsule Take 1 capsule (100 mg total) by mouth every 8 (eight) hours 90 capsule 0    lithium carbonate 300 mg capsule Take 1 capsule (300 mg total) by mouth 2 (two) times a day with meals 60 capsule 5    Misc  Devices (Commode Bedside) MISC by Does not apply route 3 (three) times a day 30 each 0    Misc  Devices ([employer]) MISC 1 Product by Does not apply route daily 1 each 0    traZODone (DESYREL) 50 mg tablet        No current facility-administered medications for this visit  No Known Allergies    Review of Systems    There were no vitals filed for this visit  I spent 60 minutes directly with the patient during this visit    [address] acknowledges that she has consented to an online visit or consultation  She understands that the online visit is based solely on information provided by her, and that, in the absence of a face-to-face physical evaluation by the physician, the diagnosis she receives is both limited and provisional in terms of accuracy and completeness  This is not intended to replace a full medical face-to-face evaluation by the physician  Mckenzie Sue understands and accepts these terms

## 2021-06-03 ENCOUNTER — TELEPHONE (OUTPATIENT)
Dept: PSYCHIATRY | Facility: CLINIC | Age: 21
End: 2021-06-03

## 2021-06-03 NOTE — TELEPHONE ENCOUNTER
Called Judy to reschedule her apt with Farooq on 7/8 as Farooq is not available that day    I left message asking her to contact our office

## 2021-06-29 ENCOUNTER — TELEMEDICINE (OUTPATIENT)
Dept: BEHAVIORAL/MENTAL HEALTH CLINIC | Facility: CLINIC | Age: 21
End: 2021-06-29
Payer: COMMERCIAL

## 2021-06-29 DIAGNOSIS — F31.32 MODERATE BIPOLAR I DISORDER, MOST RECENT EPISODE DEPRESSED (HCC): Primary | ICD-10-CM

## 2021-06-29 PROCEDURE — 90837 PSYTX W PT 60 MINUTES: CPT | Performed by: SOCIAL WORKER

## 2021-06-29 NOTE — PSYCH
Treatment Plan Tracking    3rd Treatment Plan not completed within required time limits due to: Client cancelled/no-showed scheduled appointment  Michael Romero

## 2021-06-29 NOTE — PSYCH
Psychotherapy Provided: Individual Psychotherapy 60  minutes 12:55 Pm to 1:50 Pm-It was my intent to perform this visit via video technology, but the patient was not able to do a video connection, so the visit was completed via audio telephone only  -Due to computer issues due [facility]         Goals addressed in session:(3rd Treatment Plan) The Client reported that she has taken the last semester off from school and will be returning in the Fall  She also reported that she is working and has started to learn to [employer] to help with her finances and supporting herself  " I am doing really well and  starting to see my life get together" During the session we created her recovery treatment plan and conducted screenings on depression and anxiety  Interventions: Supportive Therapy, Strengths Therapy,  and Cognitive Behavioral Therapy where the treatment modalities utilized during the session  Assessment and Plan: The Client presented a depressed anxious mood that was congruent in effect  She was alert, goal directed and participated with prompts during the session  The Client was oriented to person, place, time, situation, and reported no suicidal/homicidal ideation, plan, or intent  As team we created the Client's recovery treatment plan  She completed both her Long Term goals number one and two  On her depression screening PHQ-9 she  scored in the mild depression range  On the anxiety screening SUSI-7 the client scored the mild anxiety range  During the session we reviewed her coping skills  Next scheduled appointment was set for 1 month  Pain:      PSYCH MENTAL STATUS PAIN :6 as reported by the Client     PHYSICAL PAIN SCALE NUMBERS: 5 as reported by the Client     Current suicide risk : Low/The Client was given phone number for the [facility] [phone]       Phone number for [address] [place] [address] was given to the Client/ [phone]    Behavioral Health Treatment Plan [facility]: Diagnosis and Treatment Plan explained to Mary Ruiz  relates understanding diagnosis and is agreeable to Treatment Plan  Yes  Virtual Brief Visit    Assessment/Plan:    Problem List Items Addressed This Visit     None      Visit Diagnoses     Moderate bipolar I disorder, most recent episode depressed ([facility] [address] )    -  Primary                Reason for visit is   Chief Complaint   Patient presents with    Virtual Brief Visit        Encounter provider TOMA Nevarez    Provider located at [address] [place] [id number]    Recent Visits  No visits were found meeting these conditions  Showing recent visits within past 7 days and meeting all other requirements  Today's Visits  Date Type Provider Dept   06/29/21 Telemedicine TOMA Nevarez [facility]   Showing today's visits and meeting all other requirements  Future Appointments  No visits were found meeting these conditions  Showing future appointments within next 150 days and meeting all other requirements       After connecting through telephone, the patient was identified by name and date of birth  Tabatha Rodriguez was informed that this is a telemedicine visit and that the visit is being conducted through telephone  My office door was closed  No one else was in the room  She acknowledged consent and understanding of privacy and security of the platform  The patient has agreed to participate and understands she can discontinue the visit at any time  Patient is aware this is a billable service  Kathie Barillas is a 24 y o  female , who was seen for individual mental health [facility]     Past Medical History:   Diagnosis Date    ADHD     Anxiety     Depression     Polycystic ovaries        Past Surgical History:   Procedure Laterality Date    ORIF TIBIA & FIBULA FRACTURES Left 10/4/2020    Procedure: OPEN REDUCTION W/ INTERNAL FIXATION (ORIF) ANKLE including syndysmotic fixation;  Surgeon: Rd Bucio MD;  [facility]: [facility];  Service: Orthopedics    OK REMOVAL DEEP IMPLANT Left 11/23/2020    Procedure: REMOVAL HARDWARE ANKLE;  Surgeon: Rd Bucio MD;  [facility];  Service: Orthopedics    TONSILLECTOMY      WISDOM TOOTH EXTRACTION         Current Outpatient Medications   Medication Sig Dispense Refill    gabapentin (NEURONTIN) 100 mg capsule Take 1 capsule (100 mg total) by mouth every 8 (eight) hours 90 capsule 0    lithium carbonate 300 mg capsule Take 1 capsule (300 mg total) by mouth 2 (two) times a day with meals 60 capsule 5    Misc  Devices (Commode Bedside) MISC by Does not apply route 3 (three) times a day 30 each 0    Misc  Devices ([employer]) MISC 1 Product by Does not apply route daily 1 each 0    traZODone (DESYREL) 50 mg tablet        No current facility-administered medications for this visit  No Known Allergies    Review of Systems    There were no vitals filed for this visit  I spent 60 minutes directly with the patient during this visit    [address] acknowledges that she has consented to an online visit or consultation  She understands that the online visit is based solely on information provided by her, and that, in the absence of a face-to-face physical evaluation by the physician, the diagnosis she receives is both limited and provisional in terms of accuracy and completeness  This is not intended to replace a full medical face-to-face evaluation by the physician  Mary Draper understands and accepts these terms

## 2021-06-29 NOTE — PSYCH
Jl Cobian  2000         Date of Initial Treatment Plan: 11/05/20  Date of Current Treatment Plan: 06/28/21     Treatment Plan Number: 3rd treatment Plan      Strengths/Personal Resources for Self Care: The Client reported that she has a supportive family of origin, and peer relationships  She is actively employment within the community, and is attending [employer] in  She is being followed by Dr Maurice Fernandes for her physical health through [address] for medication management, and with this therapist for mental health therapy  Salena Graf MSW LCSW at [id number] [place] [address]       Diagnosis:   1  Moderate bipolar I disorder, most recent episode depressed (HCC)            Area of Needs: The Client reported that she would like to address her depression and anxity, that is effecting her different relationships and environments  It is hoped that The Client will achieve or maintain maximum functional capacity in performing daily activizes, taking into account both the functional capacity of the individual and those functional capacities appropriate for the individuals of the same age  Reduce or ameliorate the physical mental, behavioral, or developmental effects of an illness, condition, injury or disability   Present treatment plan will cover 4 months or 12 visits which ever comes first                Long Term Goal 1: The Client will be able to identify when presented 4 out 5 times triggers to her depression       Target Date: 11/05/2021  Completion Date:    Short Term Objectives for Goal 1:The Client will learn 4 copings skill to prevent  Her isolating due To her depression within different enviroments  (emerging)             Long Term Goal 2:  The Client's will identify and verbalize  the effects of her anxiety on her different relationships and environments when presented 4 out 5 times          Target Date: 11/05/2021  Completion Date:      Short Term Objectives for Goal 2: The Client will be able to verbalize 5 triggers to her anxiety  (emerging)           Long Term Goal # 3: The Client will be active within the community 3 out of 7 days       Target Date: 11/05/2021  Completion Date:            GOAL 1: Modality: The person(s) responsible for carrying out the plan is  the client and this therapist Margot SCHOFIELD [facility]     GOAL 2: Modality: The person(s) responsible for carrying out the plan is  the client and this therapist Margot SCHOFIELD [facility]      GOAL 3: Modality: The person(s) responsible for carrying out the plan is  The [address] [place] [address] Dr Underwood: Diagnosis and Treatment Plan explained to Judy Kim relates understanding diagnosis and is agreeable to Treatment Plan          Client Comments : Please share your thoughts, feelings, need and/or experiences regarding your treatment plan:   The Client's short term treatment plan will be reviewed and or completed on or before 10/28/2021  __________________________________________________________________     __________________________________________________________________     __________________________________________________________________     __________________________________________________________________     _______________________________________                 Patient signature, Date Time: __________________________________________        Physician cosigner signature, Date, Time: ________________________________

## 2021-10-06 ENCOUNTER — TELEPHONE (OUTPATIENT)
Dept: BEHAVIORAL/MENTAL HEALTH CLINIC | Facility: CLINIC | Age: 21
End: 2021-10-06

## 2021-11-02 ENCOUNTER — DOCUMENTATION (OUTPATIENT)
Dept: BEHAVIORAL/MENTAL HEALTH CLINIC | Facility: CLINIC | Age: 21
End: 2021-11-02

## 2021-11-02 ENCOUNTER — TELEPHONE (OUTPATIENT)
Dept: BEHAVIORAL/MENTAL HEALTH CLINIC | Facility: CLINIC | Age: 21
End: 2021-11-02

## 2021-11-16 ENCOUNTER — DOCUMENTATION (OUTPATIENT)
Dept: BEHAVIORAL/MENTAL HEALTH CLINIC | Facility: CLINIC | Age: 21
End: 2021-11-16

## 2023-03-27 ENCOUNTER — OFFICE VISIT (OUTPATIENT)
Dept: FAMILY MEDICINE CLINIC | Facility: CLINIC | Age: 23
End: 2023-03-27

## 2023-03-27 ENCOUNTER — TELEPHONE (OUTPATIENT)
Dept: FAMILY MEDICINE CLINIC | Facility: CLINIC | Age: 23
End: 2023-03-27

## 2023-03-27 ENCOUNTER — APPOINTMENT (OUTPATIENT)
Dept: LAB | Facility: MEDICAL CENTER | Age: 23
End: 2023-03-27

## 2023-03-27 VITALS
RESPIRATION RATE: 18 BRPM | WEIGHT: 280.6 LBS | DIASTOLIC BLOOD PRESSURE: 80 MMHG | SYSTOLIC BLOOD PRESSURE: 122 MMHG | HEART RATE: 72 BPM | TEMPERATURE: 98.4 F | OXYGEN SATURATION: 98 % | HEIGHT: 67 IN | BODY MASS INDEX: 44.04 KG/M2

## 2023-03-27 DIAGNOSIS — Z11.3 SCREENING FOR STDS (SEXUALLY TRANSMITTED DISEASES): ICD-10-CM

## 2023-03-27 DIAGNOSIS — Z11.4 SCREENING FOR HIV (HUMAN IMMUNODEFICIENCY VIRUS): ICD-10-CM

## 2023-03-27 DIAGNOSIS — Z11.59 NEED FOR HEPATITIS C SCREENING TEST: ICD-10-CM

## 2023-03-27 DIAGNOSIS — E88.81 INSULIN RESISTANCE: ICD-10-CM

## 2023-03-27 DIAGNOSIS — E28.2 PCOS (POLYCYSTIC OVARIAN SYNDROME): ICD-10-CM

## 2023-03-27 DIAGNOSIS — Z12.4 SCREENING FOR CERVICAL CANCER: ICD-10-CM

## 2023-03-27 DIAGNOSIS — F31.9 BIPOLAR 1 DISORDER (HCC): Primary | ICD-10-CM

## 2023-03-27 DIAGNOSIS — F31.9 BIPOLAR 1 DISORDER (HCC): ICD-10-CM

## 2023-03-27 LAB
ALBUMIN SERPL BCP-MCNC: 3.8 G/DL (ref 3.5–5)
ALP SERPL-CCNC: 39 U/L (ref 46–116)
ALT SERPL W P-5'-P-CCNC: 43 U/L (ref 12–78)
ANION GAP SERPL CALCULATED.3IONS-SCNC: 4 MMOL/L (ref 4–13)
AST SERPL W P-5'-P-CCNC: 18 U/L (ref 5–45)
BILIRUB SERPL-MCNC: 0.4 MG/DL (ref 0.2–1)
BUN SERPL-MCNC: 8 MG/DL (ref 5–25)
CALCIUM SERPL-MCNC: 9.6 MG/DL (ref 8.3–10.1)
CHLORIDE SERPL-SCNC: 110 MMOL/L (ref 96–108)
CHOLEST SERPL-MCNC: 212 MG/DL
CO2 SERPL-SCNC: 24 MMOL/L (ref 21–32)
CREAT SERPL-MCNC: 0.63 MG/DL (ref 0.6–1.3)
GFR SERPL CREATININE-BSD FRML MDRD: 125 ML/MIN/1.73SQ M
GLUCOSE P FAST SERPL-MCNC: 94 MG/DL (ref 65–99)
HDLC SERPL-MCNC: 43 MG/DL
INSULIN SERPL-ACNC: 31.6 MU/L (ref 3–25)
LDLC SERPL CALC-MCNC: 121 MG/DL (ref 0–100)
NONHDLC SERPL-MCNC: 169 MG/DL
POTASSIUM SERPL-SCNC: 4 MMOL/L (ref 3.5–5.3)
PROLACTIN SERPL-MCNC: 6.3 NG/ML
PROT SERPL-MCNC: 7.1 G/DL (ref 6.4–8.4)
SODIUM SERPL-SCNC: 138 MMOL/L (ref 135–147)
TRIGL SERPL-MCNC: 238 MG/DL
TSH SERPL DL<=0.05 MIU/L-ACNC: 2.21 UIU/ML (ref 0.45–4.5)

## 2023-03-27 RX ORDER — LAMOTRIGINE 25 MG/1
TABLET, CHEWABLE ORAL
Qty: 210 TABLET | Refills: 0 | Status: SHIPPED | OUTPATIENT
Start: 2023-03-27 | End: 2023-03-27 | Stop reason: SDUPTHER

## 2023-03-27 RX ORDER — LAMOTRIGINE 25 MG/1
TABLET, CHEWABLE ORAL
Qty: 210 TABLET | Refills: 0 | Status: SHIPPED | OUTPATIENT
Start: 2023-03-27 | End: 2023-05-22

## 2023-03-27 RX ORDER — SPIRONOLACTONE 25 MG/1
25 TABLET ORAL DAILY
Qty: 30 TABLET | Refills: 5 | Status: SHIPPED | OUTPATIENT
Start: 2023-03-27

## 2023-03-27 RX ORDER — SPIRONOLACTONE 25 MG/1
25 TABLET ORAL DAILY
Qty: 30 TABLET | Refills: 5 | Status: SHIPPED | OUTPATIENT
Start: 2023-03-27 | End: 2023-03-27 | Stop reason: SDUPTHER

## 2023-03-27 NOTE — TELEPHONE ENCOUNTER
[facility] calling needs to script of the wegovy   Should be for 28 days      Also stated the eflornithine needs prior auth-not sure if you want me to proceed or send something else in

## 2023-03-27 NOTE — PROGRESS NOTES
Name: Julianne Cuenca      : 2000      MRN: [id number]  Encounter Provider: Shan Frausto MD  Encounter Date: 3/27/2023   Encounter department: [address] [place] [address]     1  Bipolar 1 disorder ([place] [address] )    2  Need for hepatitis C screening test    3  Screening for HIV (human immunodeficiency virus)    4  Screening for STDs (sexually transmitted diseases)    5  Screening for cervical cancer    6  BMI 40 0-44 9, adult ([place] [address] )  -     Lipid panel; Future  -     Comprehensive metabolic panel; Future; Expected date: 2023  -     TSH, 3rd generation with Free T4 reflex; Future  -     Testosterone, free, total; Future  -     Prolactin; Future  -     Estrogens, total; Future  -     Ambulatory Referral to Endocrinology; Future  -     Diagnostic Sleep Study; Future; Expected date: 2023  -     Ambulatory referral to clinical pharmacy; Future  -     Insulin, fasting; Future    7  PCOS (polycystic ovarian syndrome)  -     Lipid panel; Future  -     Comprehensive metabolic panel; Future; Expected date: 2023  -     TSH, 3rd generation with Free T4 reflex; Future  -     Testosterone, free, total; Future  -     Prolactin; Future  -     Estrogens, total; Future  -     Ambulatory Referral to Endocrinology; Future  -     Diagnostic Sleep Study; Future; Expected date: 2023  -     Ambulatory referral to clinical pharmacy; Future  -     Insulin, fasting; Future    8  Insulin resistance  -     Lipid panel; Future  -     Comprehensive metabolic panel; Future; Expected date: 2023  -     TSH, 3rd generation with Free T4 reflex; Future  -     Testosterone, free, total; Future  -     Prolactin; Future  -     Estrogens, total; Future  -     Ambulatory Referral to Endocrinology; Future  -     Diagnostic Sleep Study; Future; Expected date: 2023  -     Ambulatory referral to clinical pharmacy; Future  -     Insulin, fasting;  Future           Subjective      She was diagnosed "with PCOS at age 15  She was on metformin for a bit, but didn't think it did much  She was found to have elevated testosterone levels  She has increased, course body hair and oligomenorrhea  She has more anxiety than anything  She denies HI/SI  She has bipolar 1  She was most recently on lithium  She gained 85 pounds on this medication and did not like the way she felt with it  She was also on metformin at the time  She did not like this medication  Review of Systems   All other systems reviewed and are negative  No current outpatient medications on file prior to visit  Objective     /80 (BP Location: Left arm, Patient Position: Sitting, Cuff Size: Large)   Pulse 72   Temp 98 4 °F (36 9 °C) (Tympanic)   Resp 18   Ht 5' 7\" (1 702 m)   Wt 127 kg (280 lb 9 6 oz)   SpO2 98%   BMI 43 95 kg/m²     Physical Exam  Vitals and nursing note reviewed  Constitutional:       Appearance: Normal appearance  She is obese  Cardiovascular:      Rate and Rhythm: Normal rate and regular rhythm  Pulses: Normal pulses  Heart sounds: Normal heart sounds  Pulmonary:      Effort: Pulmonary effort is normal       Breath sounds: Normal breath sounds  Abdominal:      General: Abdomen is flat  Bowel sounds are normal       Palpations: Abdomen is soft  Musculoskeletal:         General: Normal range of motion  Cervical back: Normal range of motion and neck supple  Skin:     General: Skin is warm and dry  Capillary Refill: Capillary refill takes less than 2 seconds  Neurological:      General: No focal deficit present  Mental Status: She is alert and oriented to person, place, and time  Mental status is at baseline  Psychiatric:         Mood and Affect: Mood normal          Behavior: Behavior normal          Thought Content:  Thought content normal          Judgment: Judgment normal        Lucien Ramirez MD  "

## 2023-03-28 ENCOUNTER — TELEPHONE (OUTPATIENT)
Dept: FAMILY MEDICINE CLINIC | Facility: CLINIC | Age: 23
End: 2023-03-28

## 2023-03-28 LAB
TESTOST FREE SERPL-MCNC: 4.1 PG/ML (ref 0–4.2)
TESTOST SERPL-MCNC: 65 NG/DL (ref 13–71)

## 2023-03-28 NOTE — TELEPHONE ENCOUNTER
[facility]  Sruthi Coronel, Pharmacist     Marii Pandey is a 24 y o  female who was referred to the pharmacist for obesity and weight loss education and management, referred by Yara Chong MD      Telemedicine consent  The patient was identified by name and date of birth  Marii Pandey was informed that this is a telemedicine visit and that the visit is being conducted through Telephone  My office door was closed  No one else was in the room  She acknowledged consent and understanding of privacy and security of the video platform  The patient has agreed to participate and understands they can discontinue the visit at any time  Assessment/ Plan      1  Obesity   • Baseline Weight: 279 lbs  • Most recent Weight:279 lbs   • 5% weight loss goal (to be met at week 12): 265 lbs  • Patient's weight-related disease of complication: Stage 2 - BMI > 25 with at least one severe complication or requires more aggressive weight loss for effective treatment - add pharmacotherapy with BMI > 27, consider bariatric surgery with BMI > 35 per the AACE/ACE guidelines  • Weight related complications: depression, HLD, PCOS, insulin resistance  • Medication options/discussion  o GLP-1 agonist would be best option due to history of PCOS and insulin resistance  No hx pancreatitis, MEN2, or MTC    Changes to Medication Regimen: If PCP is in agreement with plan  · Wegovy 0 25 mg weekly x 4 weeks  Updated script sent per CPA agreement with updated qty for 28 day supply  A 5% weight loss goal in 12 weeks per AACE/ACE guidelines  If goal is not reached, medication should be discontinued    2  Medication Reconciliation:   • Medication list reviewed with pt at today's visit  Medication list updated to reflect medications pt is currently taking    3  BMI Counseling There is no height or weight on file to calculate BMI   The BMI is above normal  Nutrition recommendations include reducing portion sizes, decreasing overall calorie intake and 3-5 servings of fruits/vegetables daily  Exercise recommendations include moderate aerobic physical activity for 150 minutes/week  Pharmacotherapy was ordered for patient to aid in weight loss  Monitoring: weight on home scale, BP and HR during OV with PCP    Referrals placed at this visit: None    Follow-up: 4 weeks via mychart      Subjective        1  Previous weight loss medication  • No previous medications     2  Lifestyle:   • In past 6 months has done intermittent fasting limiting to 1800 calories per day  Used to use calorie tracking  • Physical Activity: On feet all day at work  Objective       ASCVD Risk:  The ASCVD Risk score (Manuela FIGUEROA, et al , 2019) failed to calculate for the following reasons: The 2019 ASCVD risk score is only valid for ages 36 to 78     Vitals: Wt Readings from Last 5 Encounters:   03/27/23 127 kg (280 lb 9 6 oz)   02/09/21 115 kg (253 lb 9 6 oz)   11/23/20 105 kg (232 lb)   10/02/20 107 kg (235 lb)   03/14/16 107 kg (235 lb) (>99 %, Z= 2 36)*     * Growth percentiles are based on CDC (Girls, 2-20 Years) data         BP Readings from Last 3 Encounters:   03/27/23 122/80   02/09/21 144/82   12/03/20 131/81       Pulse Readings from Last 3 Encounters:   03/27/23 72   02/09/21 83   12/03/20 85       Labs:  Lab Results   Component Value Date    SODIUM 138 03/27/2023    K 4 0 03/27/2023     (H) 03/27/2023    CO2 24 03/27/2023    AGAP 4 03/27/2023    BUN 8 03/27/2023    CREATININE 0 63 03/27/2023    GLUC 89 10/05/2020    GLUF 94 03/27/2023    CALCIUM 9 6 03/27/2023    AST 18 03/27/2023    ALT 43 03/27/2023    ALKPHOS 39 (L) 03/27/2023    TP 7 1 03/27/2023    TBILI 0 40 03/27/2023    EGFR 125 03/27/2023         Pharmacist Tracking Tool       Pharmacist Tracking Tool  Reason For Outreach: Embedded Pharmacist  Demographics:  Intervention Method: Phone  Type of Intervention: New  Topics Addressed: Obesity  Pharmacologic Interventions: Medication Initiation  Non-Pharmacologic Interventions: Disease state education, Home Monitoring and Medication/Device education  Time:  Direct Patient Care: 20 mins  Care Coordination: 15 mins  Recommendation Recipient: Patient/Caregiver and Provider  Outcome: Accepted

## 2023-03-28 NOTE — TELEPHONE ENCOUNTER
Prior auth for Yasir Maldonado submitted through cover my meds (Key: [id number])       Pending outcome    Pharmacist Tracking Tool  Reason For Outreach: Embedded Pharmacist  Demographics:  Intervention Method: Chart Review  Type of Intervention: Follow-Up  Topics Addressed: Obesity  Pharmacologic Interventions: N/A  Non-Pharmacologic Interventions: Care coordination  Time:  Direct Patient Care: 0 mins  Care Coordination: 15 mins  Recommendation Recipient: N/A  Outcome: N/A

## 2023-03-31 LAB — ESTROGEN SERPL-MCNC: 102 PG/ML

## 2023-04-03 NOTE — TELEPHONE ENCOUNTER
[facility] COSME approved   Coverage Starts on: 3/28/2023 12:00 AM, Coverage Ends on: 3/28/2024

## 2023-04-25 ENCOUNTER — TELEPHONE (OUTPATIENT)
Dept: FAMILY MEDICINE CLINIC | Facility: CLINIC | Age: 23
End: 2023-04-25

## 2023-04-26 NOTE — TELEPHONE ENCOUNTER
[facility]  Socorro Aguirre, Pharmacist     Salome Styles is a 24 y o  female who was referred to the pharmacist for obesity and weight loss education and management, referred by Bossman Plasencia MD      Communication via [employer] message    Assessment/ Plan      1  Obesity   • Baseline Weight: 279 lbs  • Most recent Weight: 262 lbs   • 5% weight loss goal (to be met at week 12): 265 lbs  • Patient's weight-related disease of complication: Stage 2 - BMI > 25 with at least one severe complication or requires more aggressive weight loss for effective treatment - add pharmacotherapy with BMI > 27, consider bariatric surgery with BMI > 35 per the AACE/ACE guidelines  • Weight related complications: depression, HLD, PCOS, insulin resistance    Changes to Medication Regimen: If PCP is in agreement with plan  · Tolerating Wegovy 0 25 mg weekly  Increase further to Wegovy 0 5 mg weekly x 4 weeks  Rx sent per CPA agreement  A 5% weight loss goal in 12 weeks per AACE/ACE guidelines  If goal is not reached, medication should be discontinued    2  Medication Reconciliation:   • Medication list reviewed with pt at today's visit  Medication list updated to reflect medications pt is currently taking    3  BMI Counseling There is no height or weight on file to calculate BMI  The BMI is above normal  Nutrition recommendations include reducing portion sizes, decreasing overall calorie intake and 3-5 servings of fruits/vegetables daily  Exercise recommendations include moderate aerobic physical activity for 150 minutes/week  Pharmacotherapy was ordered for patient to aid in weight loss  Monitoring: weight on home scale, BP and HR during OV with PCP    Referrals placed at this visit: None    Follow-up: 4 weeks via [employer]      Subjective        1  Current medication  · Wegovy 0 25 mg weekly-  decreased appetite but it seems to be coming back  Some side effects but tolerable   Episode of vomiting when she ate pizza so now knows to avoid food high in fat/ grease  Otherwise no issues  Previous weight loss medication  • No previous medications     2  Lifestyle:   • In past 6 months has done intermittent fasting limiting to 1800 calories per day  Used to use calorie tracking  • Physical Activity: On feet all day at work  Objective       ASCVD Risk:  The ASCVD Risk score (Manuela FIGUEROA, et al , 2019) failed to calculate for the following reasons: The 2019 ASCVD risk score is only valid for ages 36 to 78     Vitals: Wt Readings from Last 5 Encounters:   03/27/23 127 kg (280 lb 9 6 oz)   02/09/21 115 kg (253 lb 9 6 oz)   11/23/20 105 kg (232 lb)   10/02/20 107 kg (235 lb)   03/14/16 107 kg (235 lb) (>99 %, Z= 2 36)*     * Growth percentiles are based on CDC (Girls, 2-20 Years) data         BP Readings from Last 3 Encounters:   03/27/23 122/80   02/09/21 144/82   12/03/20 131/81       Pulse Readings from Last 3 Encounters:   03/27/23 72   02/09/21 83   12/03/20 85       Labs:  Lab Results   Component Value Date    SODIUM 138 03/27/2023    K 4 0 03/27/2023     (H) 03/27/2023    CO2 24 03/27/2023    AGAP 4 03/27/2023    BUN 8 03/27/2023    CREATININE 0 63 03/27/2023    GLUC 89 10/05/2020    GLUF 94 03/27/2023    CALCIUM 9 6 03/27/2023    AST 18 03/27/2023    ALT 43 03/27/2023    ALKPHOS 39 (L) 03/27/2023    TP 7 1 03/27/2023    TBILI 0 40 03/27/2023    EGFR 125 03/27/2023         Pharmacist Tracking Tool       Pharmacist Tracking Tool  Reason For Outreach: Embedded Pharmacist  Demographics:  Intervention Method: Chart Review  Type of Intervention: Follow-Up  Topics Addressed: Obesity  Pharmacologic Interventions: Dose or Frequency Adjusted  Non-Pharmacologic Interventions: Disease state education, Home Monitoring and Medication/Device education  Time:  Direct Patient Care: 5 mins  Care Coordination: 10 mins  Recommendation Recipient: Patient/Caregiver and Provider  Outcome: Accepted

## 2023-05-01 ENCOUNTER — TELEMEDICINE (OUTPATIENT)
Dept: FAMILY MEDICINE CLINIC | Facility: CLINIC | Age: 23
End: 2023-05-01

## 2023-05-01 DIAGNOSIS — F31.9 BIPOLAR 1 DISORDER (HCC): Primary | ICD-10-CM

## 2023-05-01 RX ORDER — ARIPIPRAZOLE 10 MG/1
10 TABLET ORAL DAILY
Qty: 90 TABLET | Refills: 3 | Status: SHIPPED | OUTPATIENT
Start: 2023-05-01

## 2023-05-01 NOTE — PROGRESS NOTES
Virtual Regular Visit    Verification of patient location:    Patient is located at Home in the following state in which I hold an active license [place]      Assessment/Plan:    Problem List Items Addressed This Visit        Other    Bipolar 1 disorder ([facility] [address] ) - Primary    Relevant Medications    ARIPiprazole (ABILIFY) 10 mg tablet    Other Relevant Orders    Ambulatory Referral to [facility]    Ambulatory Referral to Psychiatry            Reason for visit is   Chief Complaint   Patient presents with    Virtual Regular Visit        Encounter provider Albaro Card MD    Provider located at [address] [place] [address]      Recent Visits  Date Type Provider Dept   05/01/23 Telemedicine MD [facility]   04/25/23 Telephone Odilno Reid, Pharmacist Zeke Dick   Showing recent visits within past 7 days and meeting all other requirements  Future Appointments  No visits were found meeting these conditions  Showing future appointments within next 150 days and meeting all other requirements       The patient was identified by name and date of birth  Lucyann Cushing was informed that this is a telemedicine visit and that the visit is being conducted through the [employer]  She agrees to proceed     My office door was closed  No one else was in the room  She acknowledged consent and understanding of privacy and security of the video platform  The patient has agreed to participate and understands they can discontinue the visit at any time  Patient is aware this is a billable service  Jadyn Escalante is a 24 y o  female    She was diagnosed with bipolar 1  She gained a lot of weight on Lithium  This is the only medication she has taken  She does not like the way Lamictal makes her feel  She feels her mood is less stable on this medication      Seroquel and Zyprexa would likely lead to greater weight gain     She has no hallucinations  She denies HI/SI  Past Medical History:   Diagnosis Date    ADHD     Anxiety     Depression     Polycystic ovaries        Past Surgical History:   Procedure Laterality Date    FRACTURE SURGERY  Oct 2019    ORIF TIBIA & FIBULA FRACTURES Left 10/04/2020    Procedure: OPEN REDUCTION W/ INTERNAL FIXATION (ORIF) ANKLE including syndysmotic fixation;  Surgeon: Carmen Zapata MD;  [facility]: [facility];  Service: Orthopedics    IN REMOVAL IMPLANT DEEP Left 11/23/2020    Procedure: REMOVAL HARDWARE ANKLE;  Surgeon: Carmen Zapata MD;  [facility];  Service: Orthopedics    TONSILLECTOMY      WISDOM TOOTH EXTRACTION         Current Outpatient Medications   Medication Sig Dispense Refill    ARIPiprazole (ABILIFY) 10 mg tablet Take 1 tablet (10 mg total) by mouth daily 90 tablet 3    Eflornithine HCl 13 9 % cream Apply topically 2 (two) times a day with meals 45 g 3    Semaglutide-Weight Management (WEGOVY) 0 5 MG/0 5ML Inject 0 5 mL (0 5 mg total) under the skin once a week 2 mL 0    spironolactone (ALDACTONE) 25 mg tablet Take 1 tablet (25 mg total) by mouth daily 30 tablet 5     No current facility-administered medications for this visit  No Known Allergies    Review of Systems   All other systems reviewed and are negative  Video Exam    There were no vitals filed for this visit  Physical Exam  Vitals and nursing note reviewed  Constitutional:       Appearance: Normal appearance  She is obese  Cardiovascular:      Rate and Rhythm: Normal rate and regular rhythm  Pulses: Normal pulses  Heart sounds: Normal heart sounds  Pulmonary:      Effort: Pulmonary effort is normal       Breath sounds: Normal breath sounds  Abdominal:      General: Abdomen is flat  Bowel sounds are normal       Palpations: Abdomen is soft  Musculoskeletal:         General: Normal range of motion  Cervical back: Normal range of motion and neck supple  Skin:     General: Skin is warm and dry  Capillary Refill: Capillary refill takes less than 2 seconds  Neurological:      General: No focal deficit present  Mental Status: She is alert and oriented to person, place, and time  Mental status is at baseline  Psychiatric:         Mood and Affect: Mood normal          Behavior: Behavior normal          Thought Content:  Thought content normal          Judgment: Judgment normal           Visit Time  Total Visit Duration:

## 2023-05-02 ENCOUNTER — TELEPHONE (OUTPATIENT)
Dept: PSYCHIATRY | Facility: CLINIC | Age: 23
End: 2023-05-02

## 2023-05-23 ENCOUNTER — TELEPHONE (OUTPATIENT)
Dept: FAMILY MEDICINE CLINIC | Facility: CLINIC | Age: 23
End: 2023-05-23

## 2023-05-26 ENCOUNTER — TELEMEDICINE (OUTPATIENT)
Dept: FAMILY MEDICINE CLINIC | Facility: CLINIC | Age: 23
End: 2023-05-26

## 2023-05-26 ENCOUNTER — TELEPHONE (OUTPATIENT)
Dept: FAMILY MEDICINE CLINIC | Facility: CLINIC | Age: 23
End: 2023-05-26

## 2023-05-26 DIAGNOSIS — E28.2 PCOS (POLYCYSTIC OVARIAN SYNDROME): Primary | ICD-10-CM

## 2023-05-26 DIAGNOSIS — Z11.4 SCREENING FOR HIV (HUMAN IMMUNODEFICIENCY VIRUS): ICD-10-CM

## 2023-05-26 DIAGNOSIS — F31.9 BIPOLAR 1 DISORDER (HCC): ICD-10-CM

## 2023-05-26 DIAGNOSIS — Z11.3 SCREENING FOR STDS (SEXUALLY TRANSMITTED DISEASES): ICD-10-CM

## 2023-05-26 NOTE — TELEPHONE ENCOUNTER
Spoke to patient about options for Behavioral Health locations due to insurance limitations and living in [place], [place]  Informed patient that the closest [place] [address] locations would be [place], [place] and [place], [place] (still approx  1 hour drive with traffic)  Also mentioned that some [facility] locations may be able to do appointments virtually, however, this depends on reason for visit and what the location feels can be completed virtually vs  In-person  Mentioned the last option would be to contact the insurance company to see about getting an exception for a location closer to her  Patient was willing to try the closer [facility]  I gave her the phone numbers for both locations  Told her if she has any issues with finding a location or getting a hold of the [facility] to please give us a call back

## 2023-05-26 NOTE — PROGRESS NOTES
Name: Bishnu Chambers      : 2000      MRN: [id number]  Encounter Provider: Cooper Bumpers, MD  Encounter Date: 2023   Encounter department: [address] [place] [address]  PCOS (polycystic ovarian syndrome)  -     Semaglutide-Weight Management (WEGOVY) 1 MG/0 5ML; Inject 0 5 mL (1 mg total) under the skin once a week    2  Screening for HIV (human immunodeficiency virus)    3  Screening for STDs (sexually transmitted diseases)    4  Bipolar 1 disorder ([email or web address] )  -     Semaglutide-Weight Management (WEGOVY) 1 MG/0 5ML; Inject 0 5 mL (1 mg total) under the skin once a week  -     Ambulatory Referral to Psychiatry; Future           Subjective      She's lost 22# since starting Wegovy  She is happy with the results and has no side effects  She has gained weight secondary to psychiatric medications  She is restricting calories and has increased her physical activity  She would like to see psychiatry  She's been given the diagnosis of bipolar 1, but she does not think this is accurate  Review of Systems   All other systems reviewed and are negative  Current Outpatient Medications on File Prior to Visit   Medication Sig   • ARIPiprazole (ABILIFY) 10 mg tablet Take 1 tablet (10 mg total) by mouth daily   • Eflornithine HCl 13 9 % cream Apply topically 2 (two) times a day with meals   • spironolactone (ALDACTONE) 25 mg tablet Take 1 tablet (25 mg total) by mouth daily   • [START ON 2023] Semaglutide-Weight Management (WEGOVY) 1 7 MG/0 75ML Inject 0 75 mL (1 7 mg total) under the skin once a week Do not start before 2023  • [START ON 2023] Semaglutide-Weight Management (WEGOVY) 2 4 MG/0 75ML Inject 0 75 mL (2 4 mg total) under the skin once a week Do not start before 2023  Objective     There were no vitals taken for this visit  Physical Exam  Vitals and nursing note reviewed     Constitutional:       Appearance: Normal appearance  She is obese  Neurological:      General: No focal deficit present  Mental Status: She is alert and oriented to person, place, and time  Mental status is at baseline  Psychiatric:         Mood and Affect: Mood normal          Behavior: Behavior normal          Thought Content:  Thought content normal          Judgment: Judgment normal        Pao Vitale MD

## 2023-05-30 NOTE — TELEPHONE ENCOUNTER
[facility]  Oliver Llamas, Pharmacist     Johnathan Benedict is a 24 y o  female who was referred to the pharmacist for obesity and weight loss education and management, referred by Mayela Cruz MD      Communication via [employer] message    Assessment/ Plan      1  Obesity   • Baseline Weight: 279 lbs  • Most recent Weight: 258 lbs (home scale)  • 5% weight loss goal (to be met at week 12): 265 lbs  • Patient's weight-related disease of complication: Stage 2 - BMI > 25 with at least one severe complication or requires more aggressive weight loss for effective treatment - add pharmacotherapy with BMI > 27, consider bariatric surgery with BMI > 35 per the AACE/ACE guidelines  • Weight related complications: depression, HLD, PCOS, insulin resistance    Changes to Medication Regimen: If PCP is in agreement with plan  · Tolerating Wegovy 0 5 mg weekly  Increase further to Wegovy 1 0 mg weekly x 4 weeks  Also sent scripts for 1 7 mg weekly x 4 weeks then 2 4 mg weekly afterwards  Rx's sent per CPA agreement  A 5% weight loss goal in 12 weeks per AACE/ACE guidelines  If goal is not reached, medication should be discontinued    2  Medication Reconciliation:   • Medication list reviewed with pt at today's visit  Medication list updated to reflect medications pt is currently taking    3  BMI Counseling There is no height or weight on file to calculate BMI  The BMI is above normal  Nutrition recommendations include reducing portion sizes, decreasing overall calorie intake and 3-5 servings of fruits/vegetables daily  Exercise recommendations include moderate aerobic physical activity for 150 minutes/week  Pharmacotherapy was ordered for patient to aid in weight loss  Monitoring: weight on home scale, BP and HR during OV with PCP    Referrals placed at this visit: None    Follow-up: Tentatively 4 weeks via [employer]   Patient aware of maternity leave and knows to contact PCP office with any questions or concerns  Subjective        1  Current medication  · Wegovy 0 5 mg weekly-  Some nausea but otherwise denies major side effects at this time  Denied vomiting, diarrhea, constipation, or abdominal pain  Previous weight loss medication  • No previous medications     2  Lifestyle:   • In past 6 months has done intermittent fasting limiting to 1800 calories per day  Used to use calorie tracking  • Physical Activity: On feet all day at work  Objective       ASCVD Risk:  The ASCVD Risk score (Manuela FIGUEROA, et al , 2019) failed to calculate for the following reasons: The 2019 ASCVD risk score is only valid for ages 36 to 78     Vitals: Wt Readings from Last 5 Encounters:   03/27/23 127 kg (280 lb 9 6 oz)   02/09/21 115 kg (253 lb 9 6 oz)   11/23/20 105 kg (232 lb)   10/02/20 107 kg (235 lb)   03/14/16 107 kg (235 lb) (>99 %, Z= 2 36)*     * Growth percentiles are based on CDC (Girls, 2-20 Years) data         BP Readings from Last 3 Encounters:   03/27/23 122/80   02/09/21 144/82   12/03/20 131/81       Pulse Readings from Last 3 Encounters:   03/27/23 72   02/09/21 83   12/03/20 85     Weight on home scale:  · 4/26/23: 262 lbs  · 5/30/23: 258 lbs    Labs:  Lab Results   Component Value Date    AGAP 4 03/27/2023    ALKPHOS 39 (L) 03/27/2023    ALT 43 03/27/2023    AST 18 03/27/2023    BUN 8 03/27/2023    CALCIUM 9 6 03/27/2023     (H) 03/27/2023    CO2 24 03/27/2023    CREATININE 0 63 03/27/2023    EGFR 125 03/27/2023    GLUC 89 10/05/2020    GLUF 94 03/27/2023    K 4 0 03/27/2023    SODIUM 138 03/27/2023    TBILI 0 40 03/27/2023    TP 7 1 03/27/2023         Pharmacist Tracking Tool       Pharmacist Tracking Tool  Reason For Outreach: Embedded Pharmacist  Demographics:  Intervention Method: Chart Review  Type of Intervention: Follow-Up  Topics Addressed: Obesity  Pharmacologic Interventions: Dose or Frequency Adjusted  Non-Pharmacologic Interventions: Disease state education, Home Monitoring and Medication/Device education  Time:  Direct Patient Care: 5 mins  Care Coordination: 10 mins  Recommendation Recipient: Patient/Caregiver and Provider  Outcome: Accepted

## 2023-06-01 ENCOUNTER — TELEPHONE (OUTPATIENT)
Dept: FAMILY MEDICINE CLINIC | Facility: CLINIC | Age: 23
End: 2023-06-01

## 2023-06-01 NOTE — TELEPHONE ENCOUNTER
Patient cannot get wegovy filled due to it being on back order  Pharmacy only has 1 7 dose available   Asking what she should do since she was supposed to be on 1

## 2023-06-01 NOTE — TELEPHONE ENCOUNTER
FYI- The jump from [facility] 0 5 mg weekly to 1 7 mg weekly is likely to cause side effects  I am working with patient to switch her temporarily to [place] while [facility] shortages are still an issue

## 2023-06-01 NOTE — TELEPHONE ENCOUNTER
Convert Wegovy 1 mg weekly to saxenda 2 4 mg daily x 1 week then saxenda 3 mg daily afterwards due to medication shortages of Wegovy  Rx pended for PCP to sign

## 2023-06-06 ENCOUNTER — ANNUAL EXAM (OUTPATIENT)
Dept: OBGYN CLINIC | Facility: CLINIC | Age: 23
End: 2023-06-06
Payer: COMMERCIAL

## 2023-06-06 VITALS
WEIGHT: 263.4 LBS | HEIGHT: 67 IN | BODY MASS INDEX: 41.34 KG/M2 | DIASTOLIC BLOOD PRESSURE: 78 MMHG | SYSTOLIC BLOOD PRESSURE: 122 MMHG

## 2023-06-06 DIAGNOSIS — E88.81 INSULIN RESISTANCE: ICD-10-CM

## 2023-06-06 DIAGNOSIS — E28.2 PCO (POLYCYSTIC OVARIES): ICD-10-CM

## 2023-06-06 DIAGNOSIS — Z01.419 ENCOUNTER FOR ANNUAL ROUTINE GYNECOLOGICAL EXAMINATION: Primary | ICD-10-CM

## 2023-06-06 DIAGNOSIS — Z11.3 SCREENING EXAMINATION FOR STD (SEXUALLY TRANSMITTED DISEASE): ICD-10-CM

## 2023-06-06 DIAGNOSIS — N92.6 IRREGULAR MENSES: ICD-10-CM

## 2023-06-06 DIAGNOSIS — Z86.19 HISTORY OF PCR DNA POSITIVE FOR HSV1: ICD-10-CM

## 2023-06-06 PROCEDURE — G0145 SCR C/V CYTO,THINLAYER,RESCR: HCPCS | Performed by: OBSTETRICS & GYNECOLOGY

## 2023-06-06 PROCEDURE — S0610 ANNUAL GYNECOLOGICAL EXAMINA: HCPCS | Performed by: OBSTETRICS & GYNECOLOGY

## 2023-06-06 PROCEDURE — 87591 N.GONORRHOEAE DNA AMP PROB: CPT | Performed by: OBSTETRICS & GYNECOLOGY

## 2023-06-06 PROCEDURE — 87491 CHLMYD TRACH DNA AMP PROBE: CPT | Performed by: OBSTETRICS & GYNECOLOGY

## 2023-06-06 RX ORDER — LITHIUM CARBONATE 300 MG/1
300 CAPSULE ORAL
COMMUNITY

## 2023-06-06 NOTE — PROGRESS NOTES
Assessment/Plan:  Pap smear done for cytology, reflex HPV  Cultures obtained for GC and chlamydia off Pap smear  Encouraged self breast examination as well as calcium supplementation  Reviewed prior GYN records, 8/2022 revealing vulvar culture positive HSV 1  Patient states that she was informed that these cultures were negative  Implications of HSV 1 reviewed  Patient extensively counseled, and appropriately upset  All questions answered  Implications of PCO reviewed  Will obtain pelvic ultrasound  She will return to office in 4 weeks for follow-up  We will discuss other options at that time including OCPs, progesterone challenge  No problem-specific Assessment & Plan notes found for this encounter  Diagnoses and all orders for this visit:    Encounter for annual routine gynecological examination  -     Liquid-based pap, screening    Irregular menses  -     US pelvis complete w transvaginal; Future    PCO (polycystic ovaries)  -     US pelvis complete w transvaginal; Future    Screening examination for STD (sexually transmitted disease)  -     Chlamydia/GC amplified DNA by PCR    Insulin resistance    History of PCR DNA positive for HSV1    Other orders  -     lithium carbonate 300 mg capsule; Take 300 mg by mouth (Patient not taking: Reported on 6/6/2023)          Subjective:      Patient ID: Pasquale Velazquez is a 24 y o  female  HPI     This is a pleasant 58-year-old female G0 presents as a new patient for first GYN exam   She went through menarche at age 15  She states her menstrual cycles have always been irregular  She has had approximately 5 menstrual cycles in the last 5 years  At age 24 she underwent extensive work-up including labs and pelvic ultrasound and was diagnosed with PCO  She was placed on metformin for 1 month and had significant GI side effects  She has not been evaluated until more recently with her primary care physician    She was then placed on Wegovy with a 30 pound "weight loss  She was unable to get her prescription refilled due to backorder  Her primary care physician then switched her to saxenda just started this week  She was evaluated 8/2022 complaining of external vaginal irritation  Office visit was initially suspicious for primary HSV outbreak  She states that she received a phone call several days later stating that her cultures were all negative  After further reviewing her lab results, she did test positive for HSV-1  She has no history of oral sores  She denies any changes in bowel or bladder function  She is sexually active for the last 6 months, more casual   She does use condoms regularly  2010 hpv vaccine    Sex + x 6 months, condoms     Culture 8/22 +HSV -1    LMP 10/2022 x 30 days    The following portions of the patient's history were reviewed and updated as appropriate: allergies, current medications, past family history, past medical history, past social history, past surgical history and problem list     Review of Systems   Constitutional: Negative for fatigue, fever and unexpected weight change  Respiratory: Negative for cough, chest tightness, shortness of breath and wheezing  Cardiovascular: Negative  Negative for chest pain and palpitations  Gastrointestinal: Negative  Negative for abdominal distention, abdominal pain, blood in stool, constipation, diarrhea, nausea and vomiting  Genitourinary: Negative  Negative for difficulty urinating, dyspareunia, dysuria, flank pain, frequency, genital sores, hematuria, pelvic pain, urgency, vaginal bleeding, vaginal discharge and vaginal pain  Skin: Negative for rash  Objective:      /78   Ht 5' 7\" (1 702 m)   Wt 119 kg (263 lb 6 4 oz)   BMI 41 25 kg/m²          Physical Exam  Constitutional:       Appearance: Normal appearance  She is well-developed  Cardiovascular:      Rate and Rhythm: Normal rate and regular rhythm     Pulmonary:      Effort: Pulmonary effort is " normal       Breath sounds: Normal breath sounds  Chest:   Breasts:     Right: No inverted nipple, mass, nipple discharge, skin change or tenderness  Left: No inverted nipple, mass, nipple discharge, skin change or tenderness  Abdominal:      General: Bowel sounds are normal  There is no distension  Palpations: Abdomen is soft  Tenderness: There is no abdominal tenderness  There is no guarding or rebound  Genitourinary:     Labia:         Right: No rash, tenderness or lesion  Left: No rash, tenderness or lesion  Vagina: Normal  No signs of injury  No vaginal discharge or tenderness  Cervix: No cervical motion tenderness, discharge, friability, lesion, erythema or cervical bleeding  Uterus: Not enlarged and not tender  Adnexa:         Right: No mass, tenderness or fullness  Left: No mass, tenderness or fullness  Neurological:      Mental Status: She is alert and oriented to person, place, and time     Psychiatric:         Behavior: Behavior normal

## 2023-06-08 LAB
C TRACH DNA SPEC QL NAA+PROBE: POSITIVE
N GONORRHOEA DNA SPEC QL NAA+PROBE: NEGATIVE

## 2023-06-09 ENCOUNTER — TELEPHONE (OUTPATIENT)
Dept: OBGYN CLINIC | Facility: CLINIC | Age: 23
End: 2023-06-09

## 2023-06-09 DIAGNOSIS — A74.9 CHLAMYDIA INFECTION: Primary | ICD-10-CM

## 2023-06-09 RX ORDER — DOXYCYCLINE 100 MG/1
100 TABLET ORAL 2 TIMES DAILY
Qty: 14 TABLET | Refills: 0 | Status: SHIPPED | OUTPATIENT
Start: 2023-06-09 | End: 2023-06-16

## 2023-06-09 NOTE — TELEPHONE ENCOUNTER
----- Message from Melina Leal DO sent at 6/9/2023  7:08 AM EDT -----  Please call the patient regarding her abnormal result  inform pt I SENT antibiotic in to her listed pharmacy x 7d ays  She must inform pt for f/u care  Repeat cultures in 3-4 weeks, no intercourse until then   She may be scheduled for a f/u appt already, please be sure it is scheduled 3-4 wks AFTER completion of antibiotic  thx

## 2023-06-12 LAB
LAB AP GYN PRIMARY INTERPRETATION: NORMAL
Lab: NORMAL

## 2023-06-12 NOTE — TELEPHONE ENCOUNTER
Patient informed of culture results & recom tx (already escribed to pharm) - patient has picked up prescription & will start now  Patient will inform partner (lives in [place]) for follow up  Appointment for CARLEE MORRIS culture & follow up appointment after ultrasound scheduled in 4 wks  Patient will schedule appointment for ultrasound now

## 2023-06-14 ENCOUNTER — HOSPITAL ENCOUNTER (OUTPATIENT)
Dept: RADIOLOGY | Facility: HOSPITAL | Age: 23
Discharge: HOME | End: 2023-06-14
Attending: OBSTETRICS & GYNECOLOGY
Payer: COMMERCIAL

## 2023-06-14 DIAGNOSIS — N92.6 IRREGULAR MENSES: ICD-10-CM

## 2023-06-14 DIAGNOSIS — E28.2 PCO (POLYCYSTIC OVARIES): ICD-10-CM

## 2023-06-14 PROCEDURE — 76830 TRANSVAGINAL US NON-OB: CPT

## 2023-06-14 PROCEDURE — 76856 US EXAM PELVIC COMPLETE: CPT

## 2023-06-19 ENCOUNTER — TELEPHONE (OUTPATIENT)
Dept: FAMILY MEDICINE CLINIC | Facility: CLINIC | Age: 23
End: 2023-06-19

## 2023-06-21 NOTE — TELEPHONE ENCOUNTER
[facility]  Yvonne Sparks, Pharmacist     Dany Craig is a 24 y o  female who was referred to the pharmacist for obesity and weight loss education and management, referred by Nubia Steen MD      Communication via [employer] message    Assessment/ Plan      1  Obesity   • Baseline Weight: 279 lbs  • Most recent Weight: 258 lbs (home scale)  • 5% weight loss goal (to be met at week 12): 265 lbs  • Patient's weight-related disease of complication: Stage 2 - BMI > 25 with at least one severe complication or requires more aggressive weight loss for effective treatment - add pharmacotherapy with BMI > 27, consider bariatric surgery with BMI > 35 per the AACE/ACE guidelines  • Weight related complications: depression, HLD, PCOS, insulin resistance    Changes to Medication Regimen: If PCP is in agreement with plan  · Continue Saxenda 2 4 mg daily for 1 week then increase to max dose of 3 0 mg daily  2  Medication Reconciliation:   • Medication list reviewed with pt at today's visit  Medication list updated to reflect medications pt is currently taking    3  BMI Counseling The BMI is above normal  Nutrition recommendations include reducing portion sizes, decreasing overall calorie intake and 3-5 servings of fruits/vegetables daily  Exercise recommendations include moderate aerobic physical activity for 150 minutes/week  Pharmacotherapy was ordered for patient to aid in weight loss  Monitoring: weight on home scale, BP and HR during OV with PCP    Referrals placed at this visit: None    Follow-up:   · As needed with clinical pharmacist  Patient is aware of maternity leave and to contact PCP office directly with any questions of concerns  Subjective        1   Current medication  · Switched Wegovy to saxenda due to medication shortages  · Currently on Saxenda 2 4 mg daily- hasn't been having a lot of serious issues in nausea / vomiting unless she eats something that doesn’t set well in the stomach  Otherwise doing okay on saxenda    Previous weight loss medication  • No previous medications     2  Lifestyle:   • In past 6 months has done intermittent fasting limiting to 1800 calories per day  Used to use calorie tracking  • Physical Activity: On feet all day at work  Objective       ASCVD Risk:  The ASCVD Risk score (Manuela FIGUEROA, et al , 2019) failed to calculate for the following reasons: The 2019 ASCVD risk score is only valid for ages 36 to 78     Vitals:     Wt Readings from Last 5 Encounters:   06/06/23 119 kg (263 lb 6 4 oz)   03/27/23 127 kg (280 lb 9 6 oz)   02/09/21 115 kg (253 lb 9 6 oz)   11/23/20 105 kg (232 lb)   10/02/20 107 kg (235 lb)       BP Readings from Last 3 Encounters:   06/06/23 122/78   03/27/23 122/80   02/09/21 144/82       Pulse Readings from Last 3 Encounters:   03/27/23 72   02/09/21 83   12/03/20 85     Weight on home scale:  · 4/26/23: 262 lbs  · 5/30/23: 258 lbs  · 6/21/23: 258 lbs     Labs:  Lab Results   Component Value Date    SODIUM 138 03/27/2023    K 4 0 03/27/2023     (H) 03/27/2023    CO2 24 03/27/2023    AGAP 4 03/27/2023    BUN 8 03/27/2023    CREATININE 0 63 03/27/2023    GLUC 89 10/05/2020    GLUF 94 03/27/2023    CALCIUM 9 6 03/27/2023    AST 18 03/27/2023    ALT 43 03/27/2023    ALKPHOS 39 (L) 03/27/2023    TP 7 1 03/27/2023    TBILI 0 40 03/27/2023    EGFR 125 03/27/2023         Pharmacist Tracking Tool       Pharmacist Tracking Tool  Reason For Outreach: Embedded Pharmacist  Demographics:  Intervention Method: Chart Review  Type of Intervention: Follow-Up  Topics Addressed: Obesity  Pharmacologic Interventions: Dose or Frequency Adjusted  Non-Pharmacologic Interventions: Disease state education, Home Monitoring and Medication/Device education  Time:  Direct Patient Care: 5 mins  Care Coordination: 10 mins  Recommendation Recipient: Patient/Caregiver and Provider  Outcome: Accepted

## 2023-07-10 ENCOUNTER — OFFICE VISIT (OUTPATIENT)
Dept: OBGYN CLINIC | Facility: CLINIC | Age: 23
End: 2023-07-10
Payer: COMMERCIAL

## 2023-07-10 VITALS
BODY MASS INDEX: 41.59 KG/M2 | DIASTOLIC BLOOD PRESSURE: 80 MMHG | WEIGHT: 265 LBS | HEIGHT: 67 IN | SYSTOLIC BLOOD PRESSURE: 120 MMHG

## 2023-07-10 DIAGNOSIS — E88.81 INSULIN RESISTANCE: ICD-10-CM

## 2023-07-10 DIAGNOSIS — Z20.2 CHLAMYDIA CONTACT, TREATED: Primary | ICD-10-CM

## 2023-07-10 DIAGNOSIS — N92.6 IRREGULAR MENSES: ICD-10-CM

## 2023-07-10 DIAGNOSIS — E28.2 PCO (POLYCYSTIC OVARIES): ICD-10-CM

## 2023-07-10 PROCEDURE — 87591 N.GONORRHOEAE DNA AMP PROB: CPT | Performed by: OBSTETRICS & GYNECOLOGY

## 2023-07-10 PROCEDURE — 87491 CHLMYD TRACH DNA AMP PROBE: CPT | Performed by: OBSTETRICS & GYNECOLOGY

## 2023-07-10 PROCEDURE — 99213 OFFICE O/P EST LOW 20 MIN: CPT | Performed by: OBSTETRICS & GYNECOLOGY

## 2023-07-10 NOTE — PROGRESS NOTES
Assessment/Plan:  Culture obtained for chlamydia, test of cure. Reviewed safe sex practices. Reviewed pelvic ultrasound and labs consistent with prior diagnosis PCO. Implications reviewed. Patient is on weight loss program.  She is reluctant to go on any hormones. At this point if there is no menses after 4 consecutive months, discussed Provera challenge as she is done in the past successfully. All questions answered. She will resume annual GYN exam 6/2024. No problem-specific Assessment & Plan notes found for this encounter. Diagnoses and all orders for this visit:    Chlamydia contact, treated    Insulin resistance    Irregular menses    PCO (polycystic ovaries)          Subjective:      Patient ID: Sarika Frausto is a 24 y.o. female. HPI     This is a very pleasant 71-year-old female G0 presents for repeat vaginal cultures. She tested positive for chlamydia. She states that her partner was recently treated for chlamydia also. They do use condoms regularly. She had a long history of irregular menses. She has had approximately 5 menstrual cycles in the last 5 years. She did have a light menstrual cycle approximately 2 weeks ago lasting 2 days described as red. She is reluctant to go on any hormones due to side effects of weight gain as she is actively trying to lose weight. This is managed through her primary care physician. LMP 10/2022 x 30days    Spotting 2 wks ago red x 2 days    6/ 14/23 pelvic ultrasound, 3 mm endometrial stripe, bilateral peripheral ovarian follicles suggestive of PCO    The following portions of the patient's history were reviewed and updated as appropriate: allergies, current medications, past family history, past medical history, past social history, past surgical history and problem list.    Review of Systems   Constitutional: Negative for fatigue, fever and unexpected weight change.    Respiratory: Negative for cough, chest tightness, shortness of breath and wheezing. Cardiovascular: Negative. Negative for chest pain and palpitations. Gastrointestinal: Negative. Negative for abdominal distention, abdominal pain, blood in stool, constipation, diarrhea, nausea and vomiting. Genitourinary: Positive for menstrual problem. Negative for difficulty urinating, dyspareunia, dysuria, flank pain, frequency, genital sores, hematuria, pelvic pain, urgency, vaginal bleeding, vaginal discharge and vaginal pain. Skin: Negative for rash. Objective:      /80   Ht 5' 7" (1.702 m)   Wt 120 kg (265 lb)   BMI 41.50 kg/m²          Physical Exam  Constitutional:       Appearance: Normal appearance. Abdominal:      General: Bowel sounds are normal. There is no distension. Palpations: Abdomen is soft. Tenderness: There is no abdominal tenderness. There is no guarding or rebound. Genitourinary:     Labia:         Right: No rash, tenderness or lesion. Left: No rash, tenderness or lesion. Vagina: No signs of injury. No vaginal discharge or tenderness. Cervix: No cervical motion tenderness, discharge, friability, lesion, erythema or cervical bleeding. Uterus: Not enlarged and not tender. Adnexa:         Right: No mass, tenderness or fullness. Left: No mass, tenderness or fullness. Neurological:      Mental Status: She is alert and oriented to person, place, and time.    Psychiatric:         Behavior: Behavior normal.

## 2023-07-11 ENCOUNTER — TELEPHONE (OUTPATIENT)
Dept: OBGYN CLINIC | Facility: CLINIC | Age: 23
End: 2023-07-11

## 2023-07-11 LAB
C TRACH DNA SPEC QL NAA+PROBE: NEGATIVE
N GONORRHOEA DNA SPEC QL NAA+PROBE: NEGATIVE

## 2023-07-11 NOTE — TELEPHONE ENCOUNTER
----- Message from Ludmila Ashby DO sent at 7/11/2023  4:13 PM EDT -----  Please call the patient test of cure culture is negative, may resume follow-up visits as scheduled.

## 2023-07-24 ENCOUNTER — TELEMEDICINE (OUTPATIENT)
Dept: PSYCHIATRY | Facility: CLINIC | Age: 23
End: 2023-07-24
Payer: COMMERCIAL

## 2023-07-24 DIAGNOSIS — F90.2 ATTENTION DEFICIT HYPERACTIVITY DISORDER (ADHD), COMBINED TYPE: ICD-10-CM

## 2023-07-24 DIAGNOSIS — F31.9 BIPOLAR 1 DISORDER (HCC): Primary | ICD-10-CM

## 2023-07-24 DIAGNOSIS — F41.8 ANXIETY WITH DEPRESSION: ICD-10-CM

## 2023-07-24 PROCEDURE — 90791 PSYCH DIAGNOSTIC EVALUATION: CPT
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
## 2023-07-24 NOTE — BH TREATMENT PLAN
[address] [place] [address]  2000     Date of Initial Psychotherapy Assessment: 7/24/2023  Date of Current Treatment Plan: 07/24/23  Treatment Plan Target Date: 7/24/2024  Treatment Plan Expiration Date: 12/31/2023    Diagnosis:   1. Bipolar 1 disorder ([address])        2. Anxiety with depression        3. Attention deficit hyperactivity disorder (ADHD), combined type            Area(s) of Need: anxiety, panic attacks, focus    Long Term Goal 1 (in the client's own words): I want to develop coping skills for anxiety, focus and panic attacks    Stage of Change: Contemplation    Target Date for completion: 7/24/3034     Anticipated therapeutic modalities: DBT, supportive therapy     People identified to complete this goal: Alexandria Harrison, therapist, prescriber      Objective 1: (identify the means of measuring success in meeting the objective): Therapist will engage Alexandria Harrison in [address] [place] [address] therapy to improve focus and develop coping strategies for anxiety and panic attacks. Client will learn DBT concepts and skills including interpersonal effectiveness, mindfulness, emotional regulation and distress tolerance. Client will practice skills and assess effectiveness with therapist biweekly and adjust skills acquisition as needed. Client will demonstrate successful use of DBT skills in at least 8 out of 10 challenging situations. Objective 2: (identify the means of measuring success in meeting the objective): Therapist will engage Alexandria Harrison in supportive therapy in order to teach coping skills for anxiety and ADHD. Alexandria Harrison will identify and successfully utilize at least 5 coping skills in 8 out of 10 situations.          I am currently under the care of a [facility] psychiatric provider: no    My [facility] psychiatric provider is: n/a    I am currently taking psychiatric medications: No    I feel that I will be ready for discharge from mental health care when I reach the following (measurable goal/objective): When I can successfully manage anxiety and stress and feel more focused in daily endeavors    For children and adults who have a legal guardian:   Has there been any change to custody orders and/or guardianship status? NA. If yes, attach updated documentation. I have created my Treatment Plan and have been offered a copy of this plan    [address]: Diagnosis and Treatment Plan explained to [address] [place] [address] [place] acknowledges an understanding of their diagnosis. Cori Ho agrees to this treatment plan. I have been offered a copy of this Treatment Plan. Yes    Cori Ho, 2000, actively participated in the creation of this treatment plan during a virtual session, using the [employer] platform. Cori Vic  provided verbal consent on 7/24/2023 at 1:51 PM. The treatment plan was transcribed into the [employer] Real Record at a later time.

## 2023-08-09 ENCOUNTER — TELEPHONE (OUTPATIENT)
Dept: PSYCHIATRY | Facility: CLINIC | Age: 23
End: 2023-08-09

## 2023-08-22 ENCOUNTER — TELEPHONE (OUTPATIENT)
Dept: PSYCHIATRY | Facility: CLINIC | Age: 23
End: 2023-08-22

## 2023-08-25 ENCOUNTER — TELEPHONE (OUTPATIENT)
Dept: PSYCHIATRY | Facility: CLINIC | Age: 23
End: 2023-08-25

## 2023-09-11 ENCOUNTER — TELEPHONE (OUTPATIENT)
Dept: PSYCHIATRY | Facility: CLINIC | Age: 23
End: 2023-09-11

## 2023-09-11 NOTE — TELEPHONE ENCOUNTER
DISCHARGE LETTER for [facility] (certified) placed in outgoing mail on 9/12/2023    Article #:  [id number] [facility]    Address:  [address] [place] [address],[address] [place] [address]

## 2023-09-21 ENCOUNTER — DOCUMENTATION (OUTPATIENT)
Dept: PSYCHIATRY | Facility: CLINIC | Age: 23
End: 2023-09-21

## 2023-09-21 NOTE — PROGRESS NOTES
Psychotherapy Discharge Summary    Preferred Name: Patt Ahumada  YOB: 2000    Admission date to psychotherapy: 7/24/2023    Referred by: self    Presenting Problem: Bipolar 1 disorder, ADHD    Course of treatment included : individual therapy     Progress/Outcome of Treatment Goals (brief summary of course of treatment) unable to assess    Treatment Complications (if any): none    Treatment Progress: unable to assess    Current [facility] Psychiatric Provider: n/a    Discharge Medications include: Abilify 10 mg    Discharge Date: 9/21/2023    Discharge Diagnosis: No diagnosis found.     Criteria for Discharge: two or more unexcused absences for services    Aftercare recommendations include (include specific referral names and phone numbers, if appropriate): return to therapy if needed    Prognosis: unable to assess

## 2024-02-27 ENCOUNTER — TELEPHONE (OUTPATIENT)
Age: 24
End: 2024-02-27

## 2024-03-05 ENCOUNTER — TELEMEDICINE (OUTPATIENT)
Dept: FAMILY MEDICINE CLINIC | Facility: CLINIC | Age: 24
End: 2024-03-05
Payer: COMMERCIAL

## 2024-03-05 DIAGNOSIS — F41.8 ANXIETY WITH DEPRESSION: Primary | ICD-10-CM

## 2024-03-05 PROCEDURE — 99213 OFFICE O/P EST LOW 20 MIN: CPT

## 2024-03-05 RX ORDER — HYDROXYZINE HYDROCHLORIDE 25 MG/1
25 TABLET, FILM COATED ORAL EVERY 6 HOURS PRN
Qty: 30 TABLET | Refills: 2 | Status: SHIPPED | OUTPATIENT
Start: 2024-03-05 | End: 2024-03-07

## 2024-03-05 NOTE — ASSESSMENT & PLAN NOTE
Was previously on Wegovy and successful.  Patient was unsuccessful on Saxenda.  Educated on healthy diet and adequate exercise on top of medication treatment.  Will start on 0.25 for 4 weeks and go from there.

## 2024-03-05 NOTE — ASSESSMENT & PLAN NOTE
Patient is prescribed hydroxyzine 25 mg every 6 hours as needed for anxiety.  Patient is educated on side effects and is to contact office if these occur.  Patient is to contact office earlier if no improvement or worsening of anxiety.  Will follow-up in 4 weeks to see how patient is doing.

## 2024-03-05 NOTE — PROGRESS NOTES
Virtual Regular Visit    Verification of patient location:    Patient is located at Home in the following state in which I hold an active license [place]      Assessment/Plan:    Problem List Items Addressed This Visit          Other    Anxiety with depression - Primary     Patient is prescribed hydroxyzine 25 mg every 6 hours as needed for anxiety.  Patient is educated on side effects and is to contact office if these occur.  Patient is to contact office earlier if no improvement or worsening of anxiety.  Will follow-up in 4 weeks to see how patient is doing.         Relevant Medications    Semaglutide-Weight Management (WEGOVY) 0.25 MG/0.5ML    hydrOXYzine HCL (ATARAX) 25 mg tablet    BMI 40.0-44.9, adult (HCC)     Was previously on Wegovy and successful.  Patient was unsuccessful on Saxenda.  Educated on healthy diet and adequate exercise on top of medication treatment.  Will start on 0.25 for 4 weeks and go from there.         Relevant Medications    Semaglutide-Weight Management (WEGOVY) 0.25 MG/0.5ML            Reason for visit is   Chief Complaint   Patient presents with    Anxiety     Wants to get on anxiety meds. Has been getting worse the past 2-3 months.  She said it's situational.    Obesity     Would like to get on Wegovy.  Was on Saxenda but it did not work.    Virtual Regular Visit          Encounter provider Gerardo Pompa PA-C    Provider located at [address] [id number]      Recent Visits  No visits were found meeting these conditions.  Showing recent visits within past 7 days and meeting all other requirements  Today's Visits  Date Type Provider Dept   03/05/24 Telemedicine Gerardo Pompa PA-C [facility]   Showing today's visits and meeting all other requirements  Future Appointments  No visits were found meeting these conditions.  Showing future appointments within next 150 days and meeting all other requirements       The patient was  identified by name and date of birth. Judy Singh was informed that this is a telemedicine visit and that the visit is being conducted through the [employer] platform. She agrees to proceed..  My office door was closed. No one else was in the room.  She acknowledged consent and understanding of privacy and security of the video platform. The patient has agreed to participate and understands they can discontinue the visit at any time.    Patient is aware this is a billable service.     Subjective  Judy Singh is a 23 y.o. female presenting for anxiety exacerbation and weight management.      Anxiety  Presents for follow-up visit. Symptoms include excessive worry, nervous/anxious behavior and panic. Patient reports no chest pain, compulsions, confusion, decreased concentration, depressed mood, dizziness, dry mouth, feeling of choking, hyperventilation, impotence, insomnia, irritability, malaise, muscle tension, nausea, obsessions, palpitations, restlessness, shortness of breath or suicidal ideas. Symptoms occur occasionally. The severity of symptoms is moderate. The quality of sleep is good.            Past Medical History:   Diagnosis Date    ADHD     Anxiety     Depression     Polycystic ovaries        Past Surgical History:   Procedure Laterality Date    FRACTURE SURGERY  Oct 2019    ORIF TIBIA & FIBULA FRACTURES Left 10/04/2020    Procedure: OPEN REDUCTION W/ INTERNAL FIXATION (ORIF) ANKLE including syndysmotic fixation;  Surgeon: Anastacio Todd MD;  Location: [facility];  Service: Orthopedics    [place] REMOVAL IMPLANT DEEP Left 11/23/2020    Procedure: REMOVAL HARDWARE ANKLE;  Surgeon: Anastacio Todd MD;  Location: [facility];  Service: Orthopedics    TONSILLECTOMY      WISDOM TOOTH EXTRACTION         Current Outpatient Medications   Medication Sig Dispense Refill    hydrOXYzine HCL (ATARAX) 25 mg tablet Take 1 tablet (25 mg total) by mouth every 6 (six) hours as needed for anxiety 30 tablet 2     Semaglutide-Weight Management (WEGOVY) 0.25 MG/0.5ML Inject 0.5 mL (0.25 mg total) under the skin once a week for 28 days 2 mL 0    Insulin Pen Needle 32G X 4 MM MISC Use daily (Patient not taking: Reported on 3/5/2024) 100 each 1     No current facility-administered medications for this visit.        No Known Allergies    Review of Systems   Constitutional:  Negative for appetite change, chills, diaphoresis, fatigue, fever and irritability.   HENT:  Negative for congestion, ear discharge, ear pain, postnasal drip, rhinorrhea, sinus pressure, sinus pain, sneezing and sore throat.    Eyes:  Negative for pain, discharge, redness, itching and visual disturbance.   Respiratory:  Negative for apnea, cough, chest tightness, shortness of breath and wheezing.    Cardiovascular:  Negative for chest pain, palpitations and leg swelling.   Gastrointestinal:  Negative for abdominal pain, blood in stool, constipation, diarrhea, nausea and vomiting.   Endocrine: Negative for cold intolerance, heat intolerance, polydipsia and polyuria.   Genitourinary:  Negative for dysuria, flank pain, frequency, hematuria, impotence and urgency.   Musculoskeletal:  Negative for arthralgias, back pain, myalgias, neck pain and neck stiffness.   Skin:  Negative for color change and rash.   Allergic/Immunologic: Negative for environmental allergies and food allergies.   Neurological:  Negative for dizziness, tremors, seizures, syncope, speech difficulty, weakness, light-headedness, numbness and headaches.   Hematological:  Negative for adenopathy. Does not bruise/bleed easily.   Psychiatric/Behavioral:  Negative for agitation, confusion, decreased concentration, dysphoric mood, hallucinations, self-injury, sleep disturbance and suicidal ideas. The patient is nervous/anxious. The patient does not have insomnia and is not hyperactive.    All other systems reviewed and are negative.      Video Exam    There were no vitals filed for this  visit.    Physical Exam  Constitutional:       General: She is not in acute distress.     Appearance: Normal appearance. She is obese. She is not ill-appearing, toxic-appearing or diaphoretic.   HENT:      Head: Normocephalic and atraumatic.   Eyes:      Extraocular Movements: Extraocular movements intact.      Conjunctiva/sclera: Conjunctivae normal.   Pulmonary:      Effort: Pulmonary effort is normal. No respiratory distress.   Musculoskeletal:      Cervical back: Normal range of motion and neck supple.      Right lower leg: No edema.      Left lower leg: No edema.   Skin:     Findings: No erythema or rash.   Neurological:      General: No focal deficit present.      Mental Status: She is alert and oriented to person, place, and time. Mental status is at baseline.   Psychiatric:         Mood and Affect: Mood normal.         Behavior: Behavior normal.         Thought Content: Thought content normal.         Judgment: Judgment normal.     Rest of exam could not be completed due to virtual appointment.    Visit Time  Total Visit Duration: 9min 18sec

## 2024-03-07 DIAGNOSIS — F41.8 ANXIETY WITH DEPRESSION: Primary | ICD-10-CM

## 2024-03-07 RX ORDER — HYDROXYZINE HYDROCHLORIDE 10 MG/1
10 TABLET, FILM COATED ORAL EVERY 6 HOURS PRN
Qty: 30 TABLET | Refills: 2 | Status: SHIPPED | OUTPATIENT
Start: 2024-03-07

## 2024-03-07 RX ORDER — HYDROXYZINE HYDROCHLORIDE 10 MG/1
10 TABLET, FILM COATED ORAL EVERY 6 HOURS PRN
Qty: 30 TABLET | Refills: 2 | Status: SHIPPED | OUTPATIENT
Start: 2024-03-07 | End: 2024-03-07

## 2024-04-10 ENCOUNTER — TELEPHONE (OUTPATIENT)
Dept: FAMILY MEDICINE CLINIC | Facility: CLINIC | Age: 24
End: 2024-04-10

## 2024-04-10 NOTE — TELEPHONE ENCOUNTER
Detail Level: Detailed
Key: [id number]  Form scanned into media  
Patient Specific Counseling (Will Not Stick From Patient To Patient): Rec complete 3 mo course of terbinafine, biotin 2.5mg qd until clear, rec tolnaftate spray once weekly to feet/shoes

## 2024-06-12 ENCOUNTER — ANNUAL EXAM (OUTPATIENT)
Dept: OBGYN CLINIC | Facility: CLINIC | Age: 24
End: 2024-06-12
Payer: COMMERCIAL

## 2024-06-12 VITALS
HEIGHT: 67 IN | DIASTOLIC BLOOD PRESSURE: 80 MMHG | WEIGHT: 280 LBS | BODY MASS INDEX: 43.95 KG/M2 | SYSTOLIC BLOOD PRESSURE: 140 MMHG

## 2024-06-12 DIAGNOSIS — Z01.419 ENCOUNTER FOR ANNUAL ROUTINE GYNECOLOGICAL EXAMINATION: Primary | ICD-10-CM

## 2024-06-12 DIAGNOSIS — E28.2 PCO (POLYCYSTIC OVARIES): ICD-10-CM

## 2024-06-12 DIAGNOSIS — N91.2 AMENORRHEA: ICD-10-CM

## 2024-06-12 DIAGNOSIS — Z11.3 SCREENING EXAMINATION FOR STD (SEXUALLY TRANSMITTED DISEASE): ICD-10-CM

## 2024-06-12 PROCEDURE — 87661 TRICHOMONAS VAGINALIS AMPLIF: CPT | Performed by: OBSTETRICS & GYNECOLOGY

## 2024-06-12 PROCEDURE — 87491 CHLMYD TRACH DNA AMP PROBE: CPT | Performed by: OBSTETRICS & GYNECOLOGY

## 2024-06-12 PROCEDURE — 87591 N.GONORRHOEAE DNA AMP PROB: CPT | Performed by: OBSTETRICS & GYNECOLOGY

## 2024-06-12 PROCEDURE — 87563 M. GENITALIUM AMP PROBE: CPT | Performed by: OBSTETRICS & GYNECOLOGY

## 2024-06-12 PROCEDURE — S0612 ANNUAL GYNECOLOGICAL EXAMINA: HCPCS | Performed by: OBSTETRICS & GYNECOLOGY

## 2024-06-13 LAB
C TRACH DNA SPEC QL NAA+PROBE: NEGATIVE
M GENITALIUM DNA SPEC QL NAA+PROBE: NEGATIVE
N GONORRHOEA DNA SPEC QL NAA+PROBE: NEGATIVE
T VAGINALIS DNA SPEC QL NAA+PROBE: NEGATIVE

## 2024-06-18 RX ORDER — SEMAGLUTIDE 0.25 MG/.5ML
INJECTION, SOLUTION SUBCUTANEOUS
Qty: 2 ML | Refills: 0 | Status: SHIPPED | OUTPATIENT
Start: 2024-06-18

## 2024-06-25 ENCOUNTER — TELEPHONE (OUTPATIENT)
Dept: FAMILY MEDICINE CLINIC | Facility: CLINIC | Age: 24
End: 2024-06-25

## 2024-07-08 ENCOUNTER — TELEMEDICINE (OUTPATIENT)
Dept: FAMILY MEDICINE CLINIC | Facility: CLINIC | Age: 24
End: 2024-07-08
Payer: COMMERCIAL

## 2024-07-08 DIAGNOSIS — Z11.59 NEED FOR HEPATITIS C SCREENING TEST: ICD-10-CM

## 2024-07-08 DIAGNOSIS — Z11.4 SCREENING FOR HIV (HUMAN IMMUNODEFICIENCY VIRUS): ICD-10-CM

## 2024-07-08 DIAGNOSIS — F31.9 BIPOLAR 1 DISORDER (HCC): Primary | ICD-10-CM

## 2024-07-08 PROCEDURE — 99214 OFFICE O/P EST MOD 30 MIN: CPT | Performed by: FAMILY MEDICINE

## 2024-07-08 RX ORDER — ALPRAZOLAM 0.25 MG/1
0.25 TABLET ORAL 3 TIMES DAILY PRN
Qty: 30 TABLET | Refills: 5 | Status: SHIPPED | OUTPATIENT
Start: 2024-07-08

## 2024-07-08 RX ORDER — ARIPIPRAZOLE 10 MG/1
10 TABLET ORAL DAILY
Qty: 90 TABLET | Refills: 3 | Status: SHIPPED | OUTPATIENT
Start: 2024-07-08

## 2024-07-08 RX ORDER — SEMAGLUTIDE 0.25 MG/.5ML
INJECTION, SOLUTION SUBCUTANEOUS
Qty: 2 ML | Refills: 0 | Status: SHIPPED | OUTPATIENT
Start: 2024-07-08

## 2024-07-09 ENCOUNTER — TELEPHONE (OUTPATIENT)
Age: 24
End: 2024-07-09

## 2024-07-09 NOTE — PROGRESS NOTES
Virtual Regular Visit  Name: Judy Singh      : 2000      MRN: [id number]  Encounter Provider: Je Pelayo MD  Encounter Date: 2024   Encounter department: [facility]    Verification of patient location:    Patient is located at Home in the following state in which I hold an active license [place]    Assessment & Plan   1. Bipolar 1 disorder (HCC)  -     ARIPiprazole (ABILIFY) 10 mg tablet; Take 1 tablet (10 mg total) by mouth daily  -     ALPRAZolam (XANAX) 0.25 mg tablet; Take 1 tablet (0.25 mg total) by mouth 3 (three) times a day as needed for anxiety  -     ARIPiprazole (ABILIFY) 10 mg tablet; Take 1 tablet (10 mg total) by mouth daily  2. Need for hepatitis C screening test  -     Hepatitis C Antibody; Future  3. Screening for HIV (human immunodeficiency virus)  -     HIV 1/2 AG/AB w Reflex [facility] for 2 yr old and above; Future  4. BMI 40.0-44.9, adult (HCC)  -     Semaglutide-Weight Management (Wegovy) 0.25 MG/0.5ML; Inject 0.25 mg under the skin weekly         Encounter provider Je Pelayo MD    The patient was identified by name and date of birth. Judy Singh was informed that this is a telemedicine visit and that the visit is being conducted through the [employer] platform. She agrees to proceed..  My office door was closed. No one else was in the room.  She acknowledged consent and understanding of privacy and security of the video platform. The patient has agreed to participate and understands they can discontinue the visit at any time.    Patient is aware this is a billable service.     History of Present Illness     Patient wants to restart Wegovy.  She had success with it in the past.    She is having panic attacks at work.  She is not taking her mood stabilizer.  She has no manic events.  She has no HI/SI.  She did well on Lithium previously, but she is not sure she can do frequent labs draws.        Review of Systems   All other systems reviewed and  are negative.    Current Outpatient Medications on File Prior to Visit   Medication Sig Dispense Refill    hydrOXYzine HCL (ATARAX) 10 mg tablet Take 1 tablet (10 mg total) by mouth every 6 (six) hours as needed for anxiety 30 tablet 2    Insulin Pen Needle 32G X 4 MM MISC Use daily (Patient not taking: Reported on 3/5/2024) 100 each 1     No current facility-administered medications on file prior to visit.      Objective     LMP  (LMP Unknown)   Physical Exam  Vitals and nursing note reviewed.   Constitutional:       Appearance: Normal appearance. She is obese.   Neurological:      General: No focal deficit present.      Mental Status: She is alert and oriented to person, place, and time. Mental status is at baseline.   Psychiatric:         Mood and Affect: Mood normal.         Behavior: Behavior normal.         Thought Content: Thought content normal.         Judgment: Judgment normal.         Visit Time  Total Visit Duration: 15

## 2024-07-09 NOTE — TELEPHONE ENCOUNTER
Semaglutide-Weight Management (Wegovy) 0.25 MG/0.5ML [[id number]]    Order Details  Dose, Route, Frequency: As Directed   Dispense Quantity: 2 mL Refills: 0          Sig: Inject 0.25 mg under the skin weekly         Start Date: 07/08/24 End Date: --   Written Date: 07/08/24 Expiration Date: 07/08/25       Associated Diagnoses: BMI 40.0-44.9, adult (HCC) [Z68.41]   Original Order: Semaglutide-Weight Management (Wegovy) 0.25 MG/0.5ML [[id number]]   Providers    Authorizing Provider:  Je Pelayo MD  [address] [place] [id number]  Phone: [phone]   Fax: [phone]  ELY #: [id number]   NPI: [id number]        Ordering User: Je Pelayo MD    Please complete a prior auth for this medication.

## 2024-07-10 NOTE — TELEPHONE ENCOUNTER
[place] for (Wegovy) 0.25 MG/0.5ML     Submitted via    [x]CMM-KEY# [id number]  []Surescripts-Case ID # Rx #: [id number]  [x]Faxed to plan   []Other website   []Phone call Case ID #     Office notes sent, clinical questions answered. Awaiting determination    Turnaround time for your insurance to make a decision on your Prior Authorization can take 7-21 business days.

## 2024-07-11 ENCOUNTER — TELEPHONE (OUTPATIENT)
Dept: FAMILY MEDICINE CLINIC | Facility: CLINIC | Age: 24
End: 2024-07-11

## 2024-07-16 NOTE — TELEPHONE ENCOUNTER
Medication was denied. Denial letter in media tab.     Dr Pelayo would like an appeal started.     Thank you

## 2024-08-22 ENCOUNTER — PATIENT MESSAGE (OUTPATIENT)
Dept: FAMILY MEDICINE CLINIC | Facility: CLINIC | Age: 24
End: 2024-08-22

## 2024-08-23 RX ORDER — SEMAGLUTIDE 0.5 MG/.5ML
INJECTION, SOLUTION SUBCUTANEOUS
Qty: 2 ML | Refills: 0 | Status: SHIPPED | OUTPATIENT
Start: 2024-08-23

## 2024-09-23 ENCOUNTER — TELEMEDICINE (OUTPATIENT)
Dept: FAMILY MEDICINE CLINIC | Facility: CLINIC | Age: 24
End: 2024-09-23
Payer: COMMERCIAL

## 2024-09-23 DIAGNOSIS — F31.9 BIPOLAR 1 DISORDER (HCC): ICD-10-CM

## 2024-09-23 PROCEDURE — 99214 OFFICE O/P EST MOD 30 MIN: CPT | Performed by: FAMILY MEDICINE

## 2024-09-23 RX ORDER — ALPRAZOLAM 0.25 MG
0.25 TABLET ORAL 3 TIMES DAILY PRN
Qty: 30 TABLET | Refills: 5 | Status: SHIPPED | OUTPATIENT
Start: 2024-09-23

## 2024-09-23 RX ORDER — ARIPIPRAZOLE 10 MG/1
10 TABLET ORAL DAILY
Qty: 90 TABLET | Refills: 3 | Status: SHIPPED | OUTPATIENT
Start: 2024-09-23

## 2024-09-24 NOTE — ASSESSMENT & PLAN NOTE
Orders:    Semaglutide-Weight Management (WEGOVY) 1 MG/0.5ML; Inject 0.5 mL (1 mg total) under the skin once a week

## 2024-09-24 NOTE — ASSESSMENT & PLAN NOTE
Orders:    ALPRAZolam (XANAX) 0.25 mg tablet; Take 1 tablet (0.25 mg total) by mouth 3 (three) times a day as needed for anxiety    ARIPiprazole (ABILIFY) 10 mg tablet; Take 1 tablet (10 mg total) by mouth daily

## 2024-09-24 NOTE — PROGRESS NOTES
Virtual Regular Visit  Name: Judy Singh      : 2000      MRN: [id number]  Encounter Provider: Je Pelayo MD  Encounter Date: 2024   Encounter department: [facility]    Verification of patient location:    Patient is located at Home in the following state in which I hold an active license [place]    Assessment & Plan  BMI 40.0-44.9, adult (HCC)    Orders:    Semaglutide-Weight Management (WEGOVY) 1 MG/0.5ML; Inject 0.5 mL (1 mg total) under the skin once a week    Bipolar 1 disorder (HCC)    Orders:    ALPRAZolam (XANAX) 0.25 mg tablet; Take 1 tablet (0.25 mg total) by mouth 3 (three) times a day as needed for anxiety    ARIPiprazole (ABILIFY) 10 mg tablet; Take 1 tablet (10 mg total) by mouth daily         Encounter provider Je Pelayo MD    The patient was identified by name and date of birth. Judy Singh was informed that this is a telemedicine visit and that the visit is being conducted through the [employer] Embedded platform. She agrees to proceed..  My office door was closed. No one else was in the room.  She acknowledged consent and understanding of privacy and security of the video platform. The patient has agreed to participate and understands they can discontinue the visit at any time.    Patient is aware this is a billable service.     History of Present Illness     She has a h/o bipolar 1.  She is happy with her mood stabilization.  She has no side effects.  She denies charlie or depression.  She has no HI/SI.    She has lost 12 pounds on Wegovy.  She has no side effects.        History obtained from : patient  Review of Systems   All other systems reviewed and are negative.          Objective     There were no vitals taken for this visit.  Physical Exam  Vitals and nursing note reviewed.   Constitutional:       Appearance: Normal appearance. She is obese.   Neurological:      General: No focal deficit present.      Mental Status: She is alert and  oriented to person, place, and time. Mental status is at baseline.   Psychiatric:         Mood and Affect: Mood normal.         Behavior: Behavior normal.         Thought Content: Thought content normal.         Judgment: Judgment normal.         Visit Time  Total Visit Duration: 15

## 2025-01-28 DIAGNOSIS — F31.9 BIPOLAR 1 DISORDER (HCC): ICD-10-CM

## 2025-01-28 RX ORDER — ALPRAZOLAM 0.25 MG/1
0.25 TABLET ORAL 3 TIMES DAILY PRN
Qty: 30 TABLET | Refills: 5 | Status: SHIPPED | OUTPATIENT
Start: 2025-01-28

## 2025-03-05 DIAGNOSIS — F31.9 BIPOLAR 1 DISORDER (HCC): ICD-10-CM

## 2025-03-05 RX ORDER — ARIPIPRAZOLE 10 MG/1
10 TABLET ORAL DAILY
Qty: 90 TABLET | Refills: 3 | Status: SHIPPED | OUTPATIENT
Start: 2025-03-05 | End: 2025-03-10 | Stop reason: SDUPTHER

## 2025-03-05 RX ORDER — ALPRAZOLAM 0.25 MG
0.25 TABLET ORAL 3 TIMES DAILY PRN
Qty: 30 TABLET | Refills: 5 | Status: SHIPPED | OUTPATIENT
Start: 2025-03-05 | End: 2025-03-10 | Stop reason: SDUPTHER

## 2025-03-10 DIAGNOSIS — F31.9 BIPOLAR 1 DISORDER (HCC): ICD-10-CM

## 2025-03-10 RX ORDER — ALPRAZOLAM 0.25 MG
0.25 TABLET ORAL 3 TIMES DAILY PRN
Qty: 30 TABLET | Refills: 5 | Status: SHIPPED | OUTPATIENT
Start: 2025-03-10

## 2025-03-10 RX ORDER — ARIPIPRAZOLE 10 MG/1
10 TABLET ORAL DAILY
Qty: 90 TABLET | Refills: 3 | Status: SHIPPED | OUTPATIENT
Start: 2025-03-10

## (undated) DEVICE — GLOVE INDICATOR PI UNDERGLOVE SZ 8.5 BLUE

## (undated) DEVICE — INTENDED FOR TISSUE SEPARATION, AND OTHER PROCEDURES THAT REQUIRE A SHARP SURGICAL BLADE TO PUNCTURE OR CUT.: Brand: BARD-PARKER SAFETY BLADES SIZE 10, STERILE

## (undated) DEVICE — SUT ETHILON 2-0 FSLX 30 IN 1674H

## (undated) DEVICE — PADDING CAST 4 IN  COTTON STRL

## (undated) DEVICE — SUT VICRYL 2-0 CTB-1 36 IN JB945

## (undated) DEVICE — GLOVE SRG BIOGEL 8

## (undated) DEVICE — GAUZE SPONGES,16 PLY: Brand: CURITY

## (undated) DEVICE — CHLORAPREP HI-LITE 26ML ORANGE

## (undated) DEVICE — SILVER-COATED ANTIMICROBIAL BARRIER DRESSING: Brand: ACTICOAT   4" X 8"

## (undated) DEVICE — SPLINT ORTHO-GLASS 3IN X 15FT

## (undated) DEVICE — DRAPE EQUIPMENT RF WAND

## (undated) DEVICE — SUT VICRYL PLUS 1 CTB-1 36 IN VCPB947H

## (undated) DEVICE — DRAPE C-ARM X-RAY

## (undated) DEVICE — 2.5MM DRILL BIT/QC/GOLD/180MM

## (undated) DEVICE — SPONGE PVP SCRUB WING STERILE

## (undated) DEVICE — DRESSING MEPILEX AG BORDER 4 X 4 IN

## (undated) DEVICE — 1.6MM KIRSCHNER WIRE W/TROCAR POINT 150MM
Type: IMPLANTABLE DEVICE | Site: ANKLE | Status: NON-FUNCTIONAL
Removed: 2020-10-04

## (undated) DEVICE — 2.5MM DRILL BIT/QC/GOLD/110MM

## (undated) DEVICE — PLUMEPEN PRO 10FT

## (undated) DEVICE — STOCKINETTE REGULAR

## (undated) DEVICE — ACE WRAP 6 IN UNSTERILE

## (undated) DEVICE — KERLIX BANDAGE ROLL: Brand: KERLIX

## (undated) DEVICE — ABDOMINAL PAD: Brand: DERMACEA

## (undated) DEVICE — UNIVERSAL MAJOR EXTREMITY,KIT: Brand: CARDINAL HEALTH

## (undated) DEVICE — BULB SYRINGE,IRRIGATION WITH PROTECTIVE CAP: Brand: DOVER